# Patient Record
Sex: MALE | Race: ASIAN | NOT HISPANIC OR LATINO
[De-identification: names, ages, dates, MRNs, and addresses within clinical notes are randomized per-mention and may not be internally consistent; named-entity substitution may affect disease eponyms.]

---

## 2021-10-15 PROBLEM — Z00.00 ENCOUNTER FOR PREVENTIVE HEALTH EXAMINATION: Status: ACTIVE | Noted: 2021-10-15

## 2021-10-18 ENCOUNTER — APPOINTMENT (OUTPATIENT)
Age: 66
End: 2021-10-18
Payer: MEDICARE

## 2021-10-18 DIAGNOSIS — Z86.79 PERSONAL HISTORY OF OTHER DISEASES OF THE CIRCULATORY SYSTEM: ICD-10-CM

## 2021-10-18 DIAGNOSIS — Z87.891 PERSONAL HISTORY OF NICOTINE DEPENDENCE: ICD-10-CM

## 2021-10-18 PROCEDURE — 99204 OFFICE O/P NEW MOD 45 MIN: CPT

## 2021-10-20 ENCOUNTER — APPOINTMENT (OUTPATIENT)
Dept: UROLOGY | Facility: CLINIC | Age: 66
End: 2021-10-20
Payer: MEDICARE

## 2021-10-20 VITALS
BODY MASS INDEX: 23.64 KG/M2 | HEART RATE: 78 BPM | DIASTOLIC BLOOD PRESSURE: 82 MMHG | SYSTOLIC BLOOD PRESSURE: 143 MMHG | TEMPERATURE: 97.5 F | HEIGHT: 68 IN | WEIGHT: 156 LBS

## 2021-10-20 PROCEDURE — 99214 OFFICE O/P EST MOD 30 MIN: CPT

## 2021-10-20 NOTE — END OF VISIT
[FreeTextEntry3] : 67yo male with incidental finding of 10cm right renal mass with renal vein thrombus extending into IVC but below hepatics on CT imaging. Imaging and report reviewed. No adenopathy. Recommend MRI for further evaluation. Recommend CT chest for staging. Reviewed with patient recommendation for right radical nephrectomy and IVC thrombectomy. Surgery may involve Vascular and Hepatobiliary colleagues as needed. May benefit from adjuvant immunotherapy treatment if cancer is localized. He is considering going to [facility] for surgery and will call us back to let us know.

## 2021-10-20 NOTE — LETTER BODY
[Dear  ___] : Dear  [unfilled], [Courtesy Letter:] : I had the pleasure of seeing your patient, [unfilled], in my office today. [Please see my note below.] : Please see my note below. [Consult Closing:] : Thank you very much for allowing me to participate in the care of this patient.  If you have any questions, please do not hesitate to contact me. [Sincerely,] : Sincerely, [FreeTextEntry2] : Shirin Mills MD\par [address], [address]\[place] [address] [FreeTextEntry3] : Jaden Garrett MD, FACS\par Urologic Oncology\par Department of Urology\par [facility]\par \par [employer] [facility] \par \par

## 2021-10-20 NOTE — ASSESSMENT
[FreeTextEntry1] : 65 yo M with right renal mass with vein thrombus\par \par - Reviewed imaging through [facility] radiology portal and confirmed findings as stated above.\par - Discussed findings with pt. Given size and thrombus, would recommend right radical nephrectomy with IVC thrombectomy. Discussed risks and benefits of procedure and pt eager to proceed.\par - WIll need to finish staging including labwork and full abdominal and pelvic imaging and chest.\par - Pt actually lives in [place] and requesting if surgery can be done in [place]. Will reach out to Dr. Jaden Garrett at [facility] to see pt asap.\par - Of note, pt is thinking of going back to [place] for procedure as well. Will keep us posted.

## 2021-10-20 NOTE — HISTORY OF PRESENT ILLNESS
[FreeTextEntry1] : 67 yo Irish male with PMHx HTN presents for 10cm right renal mass. First discovered on abdominal US which he reports was ordered by his primary for health maintenance-he has no complaints. Previous abdominal surgery appendectomy. He has no family hx of  malignancies. He is a former smoker (8 pack year hx). Denies hematuria, CP, SOB, fevers, unexplained weight loss.\par \par Today he feels well overall except for a mild headache but he had 2 teeth removed last week and SHAHID has been relieved by advil.\par \par CT w/ and w/o at [facility]iology on 10/12/21: 10.1 x 7.4 x 7.4cm heterogeneous enhancing partially exophytic mass mid to lower pole of right kidney with extensive neovascularity and extension to the renal pelvis at the lower pole. No hydronephrosis. Filling defect within right main renal vein extending into infrahepatic IVC consistent with thrombosis. Few associated collateral vessels noted in right renal fossa region. No LAD. [None] : None

## 2021-10-20 NOTE — ASSESSMENT
[FreeTextEntry1] : 67 yo male with PMHx HTN presents for 10cm right renal mass and IVC thrombus. \par \par 1. Recommended R radical nephrectomy-discussed indications, expected side effects, risks/benefits, and recovery of surgery\par 2. Medical and cardiac clearance\par 3. Will need MR abdomen renal mass protocol and CT for staging\par \par He would like to discuss with his wife today and will let us know tomorrow if moving forward\par Will place orders at that time

## 2021-10-20 NOTE — HISTORY OF PRESENT ILLNESS
[FreeTextEntry1] : 65 yo Tajik speaking M recently underwent abdominal US\par Found to have a renal mass\par Subsequent CT showed large right 10cm renal mass with IVC thrombus\par No symptoms including bothersome LUTS, gross hematuria or flank pain\par Quit smoking about 18 months ago

## 2021-10-21 ENCOUNTER — APPOINTMENT (OUTPATIENT)
Dept: HEART AND VASCULAR | Facility: CLINIC | Age: 66
End: 2021-10-21
Payer: MEDICARE

## 2021-10-21 ENCOUNTER — NON-APPOINTMENT (OUTPATIENT)
Age: 66
End: 2021-10-21

## 2021-10-21 VITALS
BODY MASS INDEX: 23.95 KG/M2 | HEIGHT: 68 IN | TEMPERATURE: 98.6 F | SYSTOLIC BLOOD PRESSURE: 120 MMHG | WEIGHT: 158 LBS | DIASTOLIC BLOOD PRESSURE: 60 MMHG | HEART RATE: 63 BPM

## 2021-10-21 LAB
ALBUMIN SERPL ELPH-MCNC: 4.2 G/DL
ALP BLD-CCNC: 94 U/L
ALT SERPL-CCNC: 18 U/L
ANION GAP SERPL CALC-SCNC: 11 MMOL/L
APPEARANCE: CLEAR
APTT BLD: 34.7 SEC
AST SERPL-CCNC: 28 U/L
BACTERIA: NEGATIVE
BASOPHILS # BLD AUTO: 0.06 K/UL
BASOPHILS NFR BLD AUTO: 1.2 %
BILIRUB SERPL-MCNC: 0.4 MG/DL
BILIRUBIN URINE: NEGATIVE
BLOOD URINE: NEGATIVE
BUN SERPL-MCNC: 22 MG/DL
CALCIUM SERPL-MCNC: 9.7 MG/DL
CHLORIDE SERPL-SCNC: 105 MMOL/L
CO2 SERPL-SCNC: 25 MMOL/L
COLOR: NORMAL
CREAT SERPL-MCNC: 1.42 MG/DL
EOSINOPHIL # BLD AUTO: 0.05 K/UL
EOSINOPHIL NFR BLD AUTO: 1 %
GLUCOSE QUALITATIVE U: NEGATIVE
GLUCOSE SERPL-MCNC: 112 MG/DL
HCT VFR BLD CALC: 44.8 %
HGB BLD-MCNC: 14.8 G/DL
HYALINE CASTS: 0 /LPF
IMM GRANULOCYTES NFR BLD AUTO: 0.2 %
INR PPP: 0.92 RATIO
KETONES URINE: NEGATIVE
LEUKOCYTE ESTERASE URINE: NEGATIVE
LYMPHOCYTES # BLD AUTO: 1.35 K/UL
LYMPHOCYTES NFR BLD AUTO: 26.1 %
MAN DIFF?: NORMAL
MCHC RBC-ENTMCNC: 30.1 PG
MCHC RBC-ENTMCNC: 33 GM/DL
MCV RBC AUTO: 91.2 FL
MICROSCOPIC-UA: NORMAL
MONOCYTES # BLD AUTO: 0.54 K/UL
MONOCYTES NFR BLD AUTO: 10.4 %
NEUTROPHILS # BLD AUTO: 3.16 K/UL
NEUTROPHILS NFR BLD AUTO: 61.1 %
NITRITE URINE: NEGATIVE
PH URINE: 6.5
PLATELET # BLD AUTO: 220 K/UL
POTASSIUM SERPL-SCNC: 4.4 MMOL/L
PROT SERPL-MCNC: 7.1 G/DL
PROTEIN URINE: NEGATIVE
PT BLD: 11 SEC
RBC # BLD: 4.91 M/UL
RBC # FLD: 12.7 %
RED BLOOD CELLS URINE: 0 /HPF
SODIUM SERPL-SCNC: 141 MMOL/L
SPECIFIC GRAVITY URINE: 1.01
SQUAMOUS EPITHELIAL CELLS: 0 /HPF
UROBILINOGEN URINE: NORMAL
WBC # FLD AUTO: 5.17 K/UL
WHITE BLOOD CELLS URINE: 0 /HPF

## 2021-10-21 PROCEDURE — 99204 OFFICE O/P NEW MOD 45 MIN: CPT | Mod: 25

## 2021-10-21 PROCEDURE — 93000 ELECTROCARDIOGRAM COMPLETE: CPT

## 2021-10-22 ENCOUNTER — NON-APPOINTMENT (OUTPATIENT)
Age: 66
End: 2021-10-22

## 2021-10-22 ENCOUNTER — APPOINTMENT (OUTPATIENT)
Dept: HEART AND VASCULAR | Facility: CLINIC | Age: 66
End: 2021-10-22
Payer: MEDICARE

## 2021-10-22 DIAGNOSIS — I10 ESSENTIAL (PRIMARY) HYPERTENSION: ICD-10-CM

## 2021-10-22 DIAGNOSIS — R01.1 CARDIAC MURMUR, UNSPECIFIED: ICD-10-CM

## 2021-10-22 LAB — BACTERIA UR CULT: NORMAL

## 2021-10-22 PROCEDURE — 93306 TTE W/DOPPLER COMPLETE: CPT

## 2021-10-25 LAB — URINE CYTOLOGY: NORMAL

## 2021-10-28 ENCOUNTER — OUTPATIENT (OUTPATIENT)
Dept: OUTPATIENT SERVICES | Facility: HOSPITAL | Age: 66
LOS: 1 days | End: 2021-10-28
Payer: MEDICAID

## 2021-10-28 ENCOUNTER — APPOINTMENT (OUTPATIENT)
Dept: MRI IMAGING | Facility: HOSPITAL | Age: 66
End: 2021-10-28
Payer: MEDICARE

## 2021-10-28 ENCOUNTER — APPOINTMENT (OUTPATIENT)
Dept: CT IMAGING | Facility: HOSPITAL | Age: 66
End: 2021-10-28
Payer: MEDICARE

## 2021-10-28 PROCEDURE — 74183 MRI ABD W/O CNTR FLWD CNTR: CPT | Mod: 26

## 2021-10-28 PROCEDURE — 71250 CT THORAX DX C-: CPT | Mod: 26

## 2021-10-28 PROCEDURE — 74183 MRI ABD W/O CNTR FLWD CNTR: CPT

## 2021-10-28 PROCEDURE — 71250 CT THORAX DX C-: CPT

## 2021-11-01 ENCOUNTER — TRANSCRIPTION ENCOUNTER (OUTPATIENT)
Age: 66
End: 2021-11-01

## 2021-11-01 VITALS
SYSTOLIC BLOOD PRESSURE: 150 MMHG | RESPIRATION RATE: 18 BRPM | TEMPERATURE: 98 F | WEIGHT: 161.82 LBS | DIASTOLIC BLOOD PRESSURE: 70 MMHG | OXYGEN SATURATION: 97 % | HEIGHT: 68.11 IN | HEART RATE: 62 BPM

## 2021-11-02 ENCOUNTER — APPOINTMENT (OUTPATIENT)
Dept: UROLOGY | Facility: HOSPITAL | Age: 66
End: 2021-11-02

## 2021-11-02 ENCOUNTER — INPATIENT (INPATIENT)
Facility: HOSPITAL | Age: 66
LOS: 1 days | Discharge: HOME | DRG: 656 | End: 2021-11-04
Attending: UROLOGY | Admitting: UROLOGY
Payer: MEDICARE

## 2021-11-02 ENCOUNTER — RESULT REVIEW (OUTPATIENT)
Age: 66
End: 2021-11-02

## 2021-11-02 DIAGNOSIS — D49.511 NEOPLASM OF UNSPECIFIED BEHAVIOR OF RIGHT KIDNEY: ICD-10-CM

## 2021-11-02 DIAGNOSIS — Z98.49 CATARACT EXTRACTION STATUS, UNSPECIFIED EYE: Chronic | ICD-10-CM

## 2021-11-02 DIAGNOSIS — I10 ESSENTIAL (PRIMARY) HYPERTENSION: ICD-10-CM

## 2021-11-02 DIAGNOSIS — Z90.49 ACQUIRED ABSENCE OF OTHER SPECIFIED PARTS OF DIGESTIVE TRACT: Chronic | ICD-10-CM

## 2021-11-02 DIAGNOSIS — Z98.890 OTHER SPECIFIED POSTPROCEDURAL STATES: Chronic | ICD-10-CM

## 2021-11-02 LAB
ANION GAP SERPL CALC-SCNC: 11 MMOL/L — SIGNIFICANT CHANGE UP (ref 5–17)
BASE EXCESS BLDA CALC-SCNC: -7 MMOL/L — LOW (ref -2–3)
BLD GP AB SCN SERPL QL: NEGATIVE — SIGNIFICANT CHANGE UP
BUN SERPL-MCNC: 18 MG/DL — SIGNIFICANT CHANGE UP (ref 7–23)
CA-I BLDA-SCNC: 0.96 MMOL/L — LOW (ref 1.15–1.33)
CALCIUM SERPL-MCNC: 8.3 MG/DL — LOW (ref 8.4–10.5)
CHLORIDE SERPL-SCNC: 111 MMOL/L — HIGH (ref 96–108)
CO2 BLDA-SCNC: 19 MMOL/L — SIGNIFICANT CHANGE UP (ref 19–24)
CO2 SERPL-SCNC: 17 MMOL/L — LOW (ref 22–31)
COHGB MFR BLDA: 0.5 % — SIGNIFICANT CHANGE UP
CREAT SERPL-MCNC: 1.34 MG/DL — HIGH (ref 0.5–1.3)
GAS PNL BLDA: SIGNIFICANT CHANGE UP
GLUCOSE BLDC GLUCOMTR-MCNC: 189 MG/DL — HIGH (ref 70–99)
GLUCOSE SERPL-MCNC: 205 MG/DL — HIGH (ref 70–99)
HCO3 BLDA-SCNC: 18 MMOL/L — LOW (ref 21–28)
HCT VFR BLD CALC: 33.1 % — LOW (ref 39–50)
HGB BLD-MCNC: 10.8 G/DL — LOW (ref 13–17)
HGB BLDA-MCNC: 10.7 G/DL — LOW (ref 12.6–17.4)
MAGNESIUM SERPL-MCNC: 1.4 MG/DL — LOW (ref 1.6–2.6)
MCHC RBC-ENTMCNC: 30.3 PG — SIGNIFICANT CHANGE UP (ref 27–34)
MCHC RBC-ENTMCNC: 32.6 GM/DL — SIGNIFICANT CHANGE UP (ref 32–36)
MCV RBC AUTO: 93 FL — SIGNIFICANT CHANGE UP (ref 80–100)
METHGB MFR BLDA: 0.2 % — SIGNIFICANT CHANGE UP
NRBC # BLD: 0 /100 WBCS — SIGNIFICANT CHANGE UP (ref 0–0)
OXYHGB MFR BLDA: 98.2 % — HIGH (ref 90–95)
PCO2 BLDA: 32 MMHG — LOW (ref 35–48)
PH BLDA: 7.35 — SIGNIFICANT CHANGE UP (ref 7.35–7.45)
PLATELET # BLD AUTO: 162 K/UL — SIGNIFICANT CHANGE UP (ref 150–400)
PO2 BLDA: 357 MMHG — HIGH (ref 83–108)
POTASSIUM BLDA-SCNC: 4 MMOL/L — SIGNIFICANT CHANGE UP (ref 3.5–5.1)
POTASSIUM SERPL-MCNC: 3.9 MMOL/L — SIGNIFICANT CHANGE UP (ref 3.5–5.3)
POTASSIUM SERPL-SCNC: 3.9 MMOL/L — SIGNIFICANT CHANGE UP (ref 3.5–5.3)
RBC # BLD: 3.56 M/UL — LOW (ref 4.2–5.8)
RBC # FLD: 12.9 % — SIGNIFICANT CHANGE UP (ref 10.3–14.5)
RH IG SCN BLD-IMP: POSITIVE — SIGNIFICANT CHANGE UP
SAO2 % BLDA: 98.9 % — HIGH (ref 94–98)
SARS-COV-2 N GENE NPH QL NAA+PROBE: NOT DETECTED
SODIUM BLDA-SCNC: 136 MMOL/L — SIGNIFICANT CHANGE UP (ref 136–145)
SODIUM SERPL-SCNC: 139 MMOL/L — SIGNIFICANT CHANGE UP (ref 135–145)
WBC # BLD: 18.49 K/UL — HIGH (ref 3.8–10.5)
WBC # FLD AUTO: 18.49 K/UL — HIGH (ref 3.8–10.5)

## 2021-11-02 PROCEDURE — 88307 TISSUE EXAM BY PATHOLOGIST: CPT | Mod: 26

## 2021-11-02 PROCEDURE — 34502 RECONSTRUCT VENA CAVA: CPT

## 2021-11-02 PROCEDURE — 50230 REMOVAL KIDNEY OPEN RADICAL: CPT | Mod: RT

## 2021-11-02 RX ORDER — MAGNESIUM SULFATE 500 MG/ML
2 VIAL (ML) INJECTION ONCE
Refills: 0 | Status: COMPLETED | OUTPATIENT
Start: 2021-11-02 | End: 2021-11-02

## 2021-11-02 RX ORDER — OXYCODONE AND ACETAMINOPHEN 5; 325 MG/1; MG/1
1 TABLET ORAL EVERY 4 HOURS
Refills: 0 | Status: DISCONTINUED | OUTPATIENT
Start: 2021-11-02 | End: 2021-11-04

## 2021-11-02 RX ORDER — LOSARTAN/HYDROCHLOROTHIAZIDE 100MG-25MG
1 TABLET ORAL
Qty: 0 | Refills: 0 | DISCHARGE

## 2021-11-02 RX ORDER — CEFAZOLIN SODIUM 1 G
1000 VIAL (EA) INJECTION EVERY 8 HOURS
Refills: 0 | Status: COMPLETED | OUTPATIENT
Start: 2021-11-02 | End: 2021-11-03

## 2021-11-02 RX ORDER — OXYCODONE AND ACETAMINOPHEN 5; 325 MG/1; MG/1
2 TABLET ORAL EVERY 6 HOURS
Refills: 0 | Status: DISCONTINUED | OUTPATIENT
Start: 2021-11-02 | End: 2021-11-04

## 2021-11-02 RX ORDER — HYDROMORPHONE HYDROCHLORIDE 2 MG/ML
0.5 INJECTION INTRAMUSCULAR; INTRAVENOUS; SUBCUTANEOUS ONCE
Refills: 0 | Status: DISCONTINUED | OUTPATIENT
Start: 2021-11-02 | End: 2021-11-02

## 2021-11-02 RX ORDER — BUPIVACAINE 13.3 MG/ML
20 INJECTION, SUSPENSION, LIPOSOMAL INFILTRATION ONCE
Refills: 0 | Status: DISCONTINUED | OUTPATIENT
Start: 2021-11-02 | End: 2021-11-04

## 2021-11-02 RX ORDER — INFLUENZA VIRUS VACCINE 15; 15; 15; 15 UG/.5ML; UG/.5ML; UG/.5ML; UG/.5ML
0.7 SUSPENSION INTRAMUSCULAR ONCE
Refills: 0 | Status: DISCONTINUED | OUTPATIENT
Start: 2021-11-02 | End: 2021-11-04

## 2021-11-02 RX ORDER — SODIUM CHLORIDE 9 MG/ML
1000 INJECTION, SOLUTION INTRAVENOUS
Refills: 0 | Status: DISCONTINUED | OUTPATIENT
Start: 2021-11-02 | End: 2021-11-04

## 2021-11-02 RX ORDER — HEPARIN SODIUM 5000 [USP'U]/ML
5000 INJECTION INTRAVENOUS; SUBCUTANEOUS EVERY 8 HOURS
Refills: 0 | Status: DISCONTINUED | OUTPATIENT
Start: 2021-11-02 | End: 2021-11-04

## 2021-11-02 RX ORDER — MORPHINE SULFATE 50 MG/1
2 CAPSULE, EXTENDED RELEASE ORAL
Refills: 0 | Status: DISCONTINUED | OUTPATIENT
Start: 2021-11-02 | End: 2021-11-04

## 2021-11-02 RX ADMIN — Medication 50 GRAM(S): at 14:45

## 2021-11-02 RX ADMIN — HYDROMORPHONE HYDROCHLORIDE 0.5 MILLIGRAM(S): 2 INJECTION INTRAMUSCULAR; INTRAVENOUS; SUBCUTANEOUS at 14:10

## 2021-11-02 RX ADMIN — SODIUM CHLORIDE 110 MILLILITER(S): 9 INJECTION, SOLUTION INTRAVENOUS at 22:58

## 2021-11-02 RX ADMIN — Medication 100 MILLIGRAM(S): at 14:29

## 2021-11-02 RX ADMIN — OXYCODONE AND ACETAMINOPHEN 2 TABLET(S): 5; 325 TABLET ORAL at 18:50

## 2021-11-02 RX ADMIN — Medication 100 MILLIGRAM(S): at 21:03

## 2021-11-02 RX ADMIN — MORPHINE SULFATE 2 MILLIGRAM(S): 50 CAPSULE, EXTENDED RELEASE ORAL at 16:00

## 2021-11-02 RX ADMIN — MORPHINE SULFATE 2 MILLIGRAM(S): 50 CAPSULE, EXTENDED RELEASE ORAL at 13:23

## 2021-11-02 RX ADMIN — HYDROMORPHONE HYDROCHLORIDE 0.5 MILLIGRAM(S): 2 INJECTION INTRAMUSCULAR; INTRAVENOUS; SUBCUTANEOUS at 13:39

## 2021-11-02 RX ADMIN — OXYCODONE AND ACETAMINOPHEN 1 TABLET(S): 5; 325 TABLET ORAL at 22:02

## 2021-11-02 RX ADMIN — MORPHINE SULFATE 2 MILLIGRAM(S): 50 CAPSULE, EXTENDED RELEASE ORAL at 14:00

## 2021-11-02 RX ADMIN — HEPARIN SODIUM 5000 UNIT(S): 5000 INJECTION INTRAVENOUS; SUBCUTANEOUS at 17:13

## 2021-11-02 RX ADMIN — OXYCODONE AND ACETAMINOPHEN 2 TABLET(S): 5; 325 TABLET ORAL at 19:30

## 2021-11-02 RX ADMIN — OXYCODONE AND ACETAMINOPHEN 1 TABLET(S): 5; 325 TABLET ORAL at 21:02

## 2021-11-02 RX ADMIN — MORPHINE SULFATE 2 MILLIGRAM(S): 50 CAPSULE, EXTENDED RELEASE ORAL at 14:20

## 2021-11-02 NOTE — PACU DISCHARGE NOTE - COMMENTS
Verbal report given to RN Wyatt, v/s stable, surgical site intact and clean, cov. with [employer]. pt. to be transferred to room 819

## 2021-11-02 NOTE — BRIEF OPERATIVE NOTE - NSICDXBRIEFPOSTOP_GEN_ALL_CORE_FT
POST-OP DIAGNOSIS:  Renal mass, right 02-Nov-2021 12:48:29  Konrad Calderón  
POST-OP DIAGNOSIS:  Renal mass, right 02-Nov-2021 12:48:29  Konrad Calderón

## 2021-11-02 NOTE — CHART NOTE - NSCHARTNOTEFT_GEN_A_CORE
Placed supine in bed for right IJ cordis removal.  Sutures removed, cordis pulled, pressure applied until hemostasis achieved.  Dry sterile dressing placed and anchored with tegaderm.  Patient tolerated without incident.

## 2021-11-02 NOTE — BRIEF OPERATIVE NOTE - OPERATION/FINDINGS
Right Radical nephrectomy performed by Urology. Thrombus noted in renal vein extending to IVC. Tumor thrombus was milked back into right renal vein and right renal vein and artery were dissected and ligated. Venotomy in IVC closed primarily. Refer to Urology OP note for further details

## 2021-11-02 NOTE — BRIEF OPERATIVE NOTE - NSICDXBRIEFPREOP_GEN_ALL_CORE_FT
PRE-OP DIAGNOSIS:  Renal mass, right 02-Nov-2021 12:47:59  Konrad Calderón  
PRE-OP DIAGNOSIS:  Renal mass, right 02-Nov-2021 12:47:59  Konrad Calderón

## 2021-11-02 NOTE — BRIEF OPERATIVE NOTE - NSICDXBRIEFPROCEDURE_GEN_ALL_CORE_FT
PROCEDURES:  Open radical nephrectomy 02-Nov-2021 12:47:44  Konrad Calderón  
PROCEDURES:  Open radical nephrectomy 02-Nov-2021 12:47:44  Konrad Calderón

## 2021-11-03 ENCOUNTER — APPOINTMENT (OUTPATIENT)
Dept: UROLOGY | Facility: CLINIC | Age: 66
End: 2021-11-03

## 2021-11-03 LAB
ANION GAP SERPL CALC-SCNC: 12 MMOL/L — SIGNIFICANT CHANGE UP (ref 5–17)
BUN SERPL-MCNC: 27 MG/DL — HIGH (ref 7–23)
CALCIUM SERPL-MCNC: 8.5 MG/DL — SIGNIFICANT CHANGE UP (ref 8.4–10.5)
CHLORIDE SERPL-SCNC: 99 MMOL/L — SIGNIFICANT CHANGE UP (ref 96–108)
CO2 SERPL-SCNC: 24 MMOL/L — SIGNIFICANT CHANGE UP (ref 22–31)
CREAT SERPL-MCNC: 1.8 MG/DL — HIGH (ref 0.5–1.3)
GLUCOSE BLDC GLUCOMTR-MCNC: 133 MG/DL — HIGH (ref 70–99)
GLUCOSE SERPL-MCNC: 140 MG/DL — HIGH (ref 70–99)
HCT VFR BLD CALC: 31.5 % — LOW (ref 39–50)
HGB BLD-MCNC: 10.2 G/DL — LOW (ref 13–17)
MCHC RBC-ENTMCNC: 29.1 PG — SIGNIFICANT CHANGE UP (ref 27–34)
MCHC RBC-ENTMCNC: 32.4 GM/DL — SIGNIFICANT CHANGE UP (ref 32–36)
MCV RBC AUTO: 90 FL — SIGNIFICANT CHANGE UP (ref 80–100)
NRBC # BLD: 0 /100 WBCS — SIGNIFICANT CHANGE UP (ref 0–0)
PLATELET # BLD AUTO: 145 K/UL — LOW (ref 150–400)
POTASSIUM SERPL-MCNC: 4.9 MMOL/L — SIGNIFICANT CHANGE UP (ref 3.5–5.3)
POTASSIUM SERPL-SCNC: 4.9 MMOL/L — SIGNIFICANT CHANGE UP (ref 3.5–5.3)
RBC # BLD: 3.5 M/UL — LOW (ref 4.2–5.8)
RBC # FLD: 13.1 % — SIGNIFICANT CHANGE UP (ref 10.3–14.5)
SODIUM SERPL-SCNC: 135 MMOL/L — SIGNIFICANT CHANGE UP (ref 135–145)
WBC # BLD: 13.68 K/UL — HIGH (ref 3.8–10.5)
WBC # FLD AUTO: 13.68 K/UL — HIGH (ref 3.8–10.5)

## 2021-11-03 RX ORDER — LOSARTAN POTASSIUM 100 MG/1
100 TABLET, FILM COATED ORAL ONCE
Refills: 0 | Status: COMPLETED | OUTPATIENT
Start: 2021-11-03 | End: 2021-11-03

## 2021-11-03 RX ADMIN — OXYCODONE AND ACETAMINOPHEN 2 TABLET(S): 5; 325 TABLET ORAL at 06:53

## 2021-11-03 RX ADMIN — OXYCODONE AND ACETAMINOPHEN 1 TABLET(S): 5; 325 TABLET ORAL at 09:49

## 2021-11-03 RX ADMIN — HEPARIN SODIUM 5000 UNIT(S): 5000 INJECTION INTRAVENOUS; SUBCUTANEOUS at 09:30

## 2021-11-03 RX ADMIN — OXYCODONE AND ACETAMINOPHEN 2 TABLET(S): 5; 325 TABLET ORAL at 01:52

## 2021-11-03 RX ADMIN — OXYCODONE AND ACETAMINOPHEN 1 TABLET(S): 5; 325 TABLET ORAL at 09:30

## 2021-11-03 RX ADMIN — LOSARTAN POTASSIUM 100 MILLIGRAM(S): 100 TABLET, FILM COATED ORAL at 16:24

## 2021-11-03 RX ADMIN — Medication 100 MILLIGRAM(S): at 05:24

## 2021-11-03 RX ADMIN — HEPARIN SODIUM 5000 UNIT(S): 5000 INJECTION INTRAVENOUS; SUBCUTANEOUS at 16:24

## 2021-11-03 RX ADMIN — HEPARIN SODIUM 5000 UNIT(S): 5000 INJECTION INTRAVENOUS; SUBCUTANEOUS at 00:47

## 2021-11-03 RX ADMIN — OXYCODONE AND ACETAMINOPHEN 1 TABLET(S): 5; 325 TABLET ORAL at 05:00

## 2021-11-03 RX ADMIN — OXYCODONE AND ACETAMINOPHEN 2 TABLET(S): 5; 325 TABLET ORAL at 00:52

## 2021-11-03 RX ADMIN — OXYCODONE AND ACETAMINOPHEN 1 TABLET(S): 5; 325 TABLET ORAL at 02:55

## 2021-11-03 NOTE — PHYSICAL THERAPY INITIAL EVALUATION ADULT - ADDITIONAL COMMENTS
Pt lives in elevator building without stairs with wife. Indpt prior to sx, no limitations. Amb tolerance ~1 hour. No falls, no device use.

## 2021-11-03 NOTE — PHYSICAL THERAPY INITIAL EVALUATION ADULT - GENERAL OBSERVATIONS, REHAB EVAL
Spoke to RN Thi, pt cleared for PT. POD#1 R open nephrectomy. Pt rcvd OOB to chair, +tele, +chairalarm, +IV, incision with scant bloody drainage. Pt agreeable to PT, reports 6/10 pain, daughter bedside. Tolerated session fairly well, demo CG transfers, supervision amb 75ftx2, CG 4 steps.

## 2021-11-04 ENCOUNTER — TRANSCRIPTION ENCOUNTER (OUTPATIENT)
Age: 66
End: 2021-11-04

## 2021-11-04 VITALS
DIASTOLIC BLOOD PRESSURE: 72 MMHG | OXYGEN SATURATION: 93 % | HEART RATE: 78 BPM | RESPIRATION RATE: 17 BRPM | SYSTOLIC BLOOD PRESSURE: 152 MMHG

## 2021-11-04 LAB
ANION GAP SERPL CALC-SCNC: 9 MMOL/L — SIGNIFICANT CHANGE UP (ref 5–17)
BASOPHILS # BLD AUTO: 0.04 K/UL — SIGNIFICANT CHANGE UP (ref 0–0.2)
BASOPHILS NFR BLD AUTO: 0.4 % — SIGNIFICANT CHANGE UP (ref 0–2)
BUN SERPL-MCNC: 24 MG/DL — HIGH (ref 7–23)
CALCIUM SERPL-MCNC: 8.4 MG/DL — SIGNIFICANT CHANGE UP (ref 8.4–10.5)
CHLORIDE SERPL-SCNC: 106 MMOL/L — SIGNIFICANT CHANGE UP (ref 96–108)
CO2 SERPL-SCNC: 24 MMOL/L — SIGNIFICANT CHANGE UP (ref 22–31)
CREAT SERPL-MCNC: 1.84 MG/DL — HIGH (ref 0.5–1.3)
EOSINOPHIL # BLD AUTO: 0.01 K/UL — SIGNIFICANT CHANGE UP (ref 0–0.5)
EOSINOPHIL NFR BLD AUTO: 0.1 % — SIGNIFICANT CHANGE UP (ref 0–6)
GLUCOSE SERPL-MCNC: 124 MG/DL — HIGH (ref 70–99)
HCT VFR BLD CALC: 25.8 % — LOW (ref 39–50)
HGB BLD-MCNC: 8.7 G/DL — LOW (ref 13–17)
IMM GRANULOCYTES NFR BLD AUTO: 0.4 % — SIGNIFICANT CHANGE UP (ref 0–1.5)
LYMPHOCYTES # BLD AUTO: 1.23 K/UL — SIGNIFICANT CHANGE UP (ref 1–3.3)
LYMPHOCYTES # BLD AUTO: 11.1 % — LOW (ref 13–44)
MAGNESIUM SERPL-MCNC: 1.8 MG/DL — SIGNIFICANT CHANGE UP (ref 1.6–2.6)
MCHC RBC-ENTMCNC: 30.7 PG — SIGNIFICANT CHANGE UP (ref 27–34)
MCHC RBC-ENTMCNC: 33.7 GM/DL — SIGNIFICANT CHANGE UP (ref 32–36)
MCV RBC AUTO: 91.2 FL — SIGNIFICANT CHANGE UP (ref 80–100)
MONOCYTES # BLD AUTO: 0.72 K/UL — SIGNIFICANT CHANGE UP (ref 0–0.9)
MONOCYTES NFR BLD AUTO: 6.5 % — SIGNIFICANT CHANGE UP (ref 2–14)
NEUTROPHILS # BLD AUTO: 9.03 K/UL — HIGH (ref 1.8–7.4)
NEUTROPHILS NFR BLD AUTO: 81.5 % — HIGH (ref 43–77)
NRBC # BLD: 0 /100 WBCS — SIGNIFICANT CHANGE UP (ref 0–0)
PHOSPHATE SERPL-MCNC: 2 MG/DL — LOW (ref 2.5–4.5)
PLATELET # BLD AUTO: 127 K/UL — LOW (ref 150–400)
POTASSIUM SERPL-MCNC: 4.2 MMOL/L — SIGNIFICANT CHANGE UP (ref 3.5–5.3)
POTASSIUM SERPL-SCNC: 4.2 MMOL/L — SIGNIFICANT CHANGE UP (ref 3.5–5.3)
RBC # BLD: 2.83 M/UL — LOW (ref 4.2–5.8)
RBC # FLD: 13 % — SIGNIFICANT CHANGE UP (ref 10.3–14.5)
SODIUM SERPL-SCNC: 139 MMOL/L — SIGNIFICANT CHANGE UP (ref 135–145)
WBC # BLD: 11.07 K/UL — HIGH (ref 3.8–10.5)
WBC # FLD AUTO: 11.07 K/UL — HIGH (ref 3.8–10.5)

## 2021-11-04 RX ORDER — SODIUM,POTASSIUM PHOSPHATES 278-250MG
1 POWDER IN PACKET (EA) ORAL ONCE
Refills: 0 | Status: DISCONTINUED | OUTPATIENT
Start: 2021-11-04 | End: 2021-11-04

## 2021-11-04 RX ADMIN — OXYCODONE AND ACETAMINOPHEN 2 TABLET(S): 5; 325 TABLET ORAL at 06:05

## 2021-11-04 RX ADMIN — HEPARIN SODIUM 5000 UNIT(S): 5000 INJECTION INTRAVENOUS; SUBCUTANEOUS at 07:25

## 2021-11-04 RX ADMIN — HEPARIN SODIUM 5000 UNIT(S): 5000 INJECTION INTRAVENOUS; SUBCUTANEOUS at 00:28

## 2021-11-04 RX ADMIN — HEPARIN SODIUM 5000 UNIT(S): 5000 INJECTION INTRAVENOUS; SUBCUTANEOUS at 17:33

## 2021-11-04 RX ADMIN — OXYCODONE AND ACETAMINOPHEN 2 TABLET(S): 5; 325 TABLET ORAL at 06:35

## 2021-11-04 NOTE — DISCHARGE NOTE NURSING/CASE MANAGEMENT/SOCIAL WORK - PATIENT PORTAL LINK FT
You can access the FollowMyHealth Patient Portal offered by [facility] by registering at the following website: [email or web address]. By joining [employer]’s FollowMyHealth portal, you will also be able to view your health information using other applications (apps) compatible with our system.

## 2021-11-04 NOTE — PROGRESS NOTE ADULT - SUBJECTIVE AND OBJECTIVE BOX
POD: 1  Procedure: R radical nephrectomy, IVC thrombectomy    SUBJECTIVE: Resting comfortably in chair. Pain is tolerable, has already walked around this morning. Marimar CLD, No flatus or BM yet. Hiccuping all through the night.       Vital Signs Last 24 Hrs  T(C): 36.8 (03 Nov 2021 08:56), Max: 37 (03 Nov 2021 05:12)  T(F): 98.2 (03 Nov 2021 08:56), Max: 98.6 (03 Nov 2021 05:12)  HR: 70 (03 Nov 2021 08:30) (65 - 89)  BP: 141/64 (03 Nov 2021 08:30) (99/56 - 141/64)  BP(mean): 92 (03 Nov 2021 08:30) (74 - 92)  RR: 18 (03 Nov 2021 08:30) (11 - 27)  SpO2: 93% (03 Nov 2021 08:30) (93% - 99%)    Physical Exam:  General: NAD  Pulmonary: Nonlabored breathing, no respiratory distress  Abdominal: soft, nondistended, appropriate incisional tender most concentrated on the right side  Extremities: WWP, normal strength, no LE edema, palpable DP pulses bilaterally   Neuro: A/O x3    Lines/drains/tubes:    I&O's Summary    02 Nov 2021 07:01  -  03 Nov 2021 07:00  --------------------------------------------------------  IN: 2090 mL / OUT: 1160 mL / NET: 930 mL        LABS:                        10.8   18.49 )-----------( 162      ( 02 Nov 2021 14:08 )             33.1     11-02    139  |  111<H>  |  18  ----------------------------<  205<H>  3.9   |  17<L>  |  1.34<H>    Ca    8.3<L>      02 Nov 2021 14:08  Mg     1.4     11-02          CAPILLARY BLOOD GLUCOSE      POCT Blood Glucose.: 189 mg/dL (02 Nov 2021 11:37)        RADIOLOGY & ADDITIONAL STUDIES:    
Vascular Surgery Post-Op Note     Pre-Op Dx: R renal mass w/ IVC involvement  Procedure: Open radical nephrectomy    Subjective: Patient resting comfortably in bed. Pain is about a 3/10.     Vital Signs Last 24 Hrs  T(C): 36.4 (02 Nov 2021 16:25), Max: 36.4 (02 Nov 2021 13:00)  T(F): 97.5 (02 Nov 2021 16:25), Max: 97.6 (02 Nov 2021 13:00)  HR: 69 (02 Nov 2021 16:25) (65 - 89)  BP: 126/69 (02 Nov 2021 16:25) (99/56 - 131/57)  BP(mean): 92 (02 Nov 2021 16:25) (74 - 92)  RR: 20 (02 Nov 2021 16:25) (11 - 27)  SpO2: 96% (02 Nov 2021 16:25) (95% - 99%)    Physical Exam:  General: NAD, resting comfortably in bed  Pulmonary: Nonlabored breathing, no respiratory distress  Abdominal: soft, nondistended, appropriately tender near incision with no rebound or guarding, incision CDI   Extremities: WWP, no edema, no pain, palpable DP bilaterally   Neuro: A/O x 3      LABS:                        10.8   18.49 )-----------( 162      ( 02 Nov 2021 14:08 )             33.1     11-02    139  |  111<H>  |  18  ----------------------------<  205<H>  3.9   |  17<L>  |  1.34<H>    Ca    8.3<L>      02 Nov 2021 14:08  Mg     1.4     11-02        CAPILLARY BLOOD GLUCOSE      POCT Blood Glucose.: 189 mg/dL (02 Nov 2021 11:37)        ABO Interpretation: O (11-02 @ 09:04)        Radiology and Additional Studies:    Assessment:66y Male s/p Open radical nephrectomy    Pain/nausea control PRN  Home meds  Incentive spirometer/OOB/Ambulate  IVF, Diet: clears  AM labs  
POD: 2  Procedure: R radical nephrectomy, IVC thrombectomy        SUBJECTIVE: Patient complains of hiccups last evening but overall feeling well. Ambulating around the room. Denies chest pain, dyspnea, n/v, leg pain. Not yet passed flatus or had bowel movement.       MEDICATIONS  (STANDING):  BUpivacaine liposome 1.3% Injectable (no eMAR) 20 milliLiter(s) Local Injection once  heparin   Injectable 5000 Unit(s) SubCutaneous every 8 hours  influenza  Vaccine (HIGH DOSE) 0.7 milliLiter(s) IntraMuscular once  lactated ringers. 1000 milliLiter(s) (110 mL/Hr) IV Continuous <Continuous>    MEDICATIONS  (PRN):  morphine  - Injectable 2 milliGRAM(s) IV Push every 15 minutes PRN Severe Pain (7 - 10)  oxycodone    5 mG/acetaminophen 325 mG 1 Tablet(s) Oral every 4 hours PRN Moderate Pain (4 - 6)  oxycodone    5 mG/acetaminophen 325 mG 2 Tablet(s) Oral every 6 hours PRN Severe Pain (7 - 10)      Vital Signs Last 24 Hrs  T(C): 37.3 (04 Nov 2021 04:58), Max: 37.3 (04 Nov 2021 04:58)  T(F): 99.2 (04 Nov 2021 04:58), Max: 99.2 (04 Nov 2021 04:58)  HR: 90 (04 Nov 2021 04:58) (62 - 90)  BP: 114/76 (04 Nov 2021 04:58) (114/76 - 166/72)  BP(mean): 99 (03 Nov 2021 15:35) (92 - 104)  RR: 18 (04 Nov 2021 04:58) (17 - 18)  SpO2: 91% (04 Nov 2021 04:58) (91% - 98%)    Physical Exam:  General: NAD  Pulmonary: Nonlabored breathing, no respiratory distress  Abdominal: soft, nondistended, incision cdi minimal tenderness  Extremities: WWP, normal strength, no LE edema, palpable DP pulses bilaterally   Neuro: A/O x3    I&O's Summary    03 Nov 2021 07:01  -  04 Nov 2021 07:00  --------------------------------------------------------  IN: 1980 mL / OUT: 2800 mL / NET: -820 mL        LABS:                        8.7    11.07 )-----------( 127      ( 04 Nov 2021 06:35 )             25.8     11-04    139  |  106  |  24<H>  ----------------------------<  124<H>  4.2   |  24  |  1.84<H>    Ca    8.4      04 Nov 2021 06:35  Phos  2.0     11-04  Mg     1.8     11-04          CAPILLARY BLOOD GLUCOSE      POCT Blood Glucose.: 133 mg/dL (03 Nov 2021 17:11)        RADIOLOGY & ADDITIONAL STUDIES:  
 AM Note    No acute events overnight.      Vital Signs Last 24 Hrs  T(C): 37 (11-03-21 @ 05:12), Max: 37 (11-03-21 @ 05:12)  T(F): 98.6 (11-03-21 @ 05:12), Max: 98.6 (11-03-21 @ 05:12)  HR: 68 (11-03-21 @ 04:02) (65 - 89)  BP: 127/69 (11-03-21 @ 04:02) (99/56 - 131/65)  BP(mean): 92 (11-03-21 @ 04:02) (74 - 92)  RR: 16 (11-03-21 @ 04:02) (11 - 27)  SpO2: 93% (11-03-21 @ 04:02) (93% - 99%)     02 Nov 2021 14:08    139    |  111    |  18     ----------------------------<  205    3.9     |  17     |  1.34     Ca    8.3        02 Nov 2021 14:08  Mg     1.4       02 Nov 2021 14:08                            10.8   18.49 )-----------( 162      ( 02 Nov 2021 14:08 )             33.1         I&O's Summary    02 Nov 2021 07:01  -  03 Nov 2021 06:21  --------------------------------------------------------  IN: 2090 mL / OUT: 1160 mL / NET: 930 mL          PHYSICAL EXAM:    GEN: NAD  ABD: incisions dry and clean, covered by clean dressing, minimally tender to palpation appropriate, minimal distention   : carrizales catheter in place draining yellow clear
 AM Note    No acute events overnight.      Vital Signs Last 24 Hrs  T(C): 37.3 (11-04-21 @ 04:58), Max: 37.3 (11-04-21 @ 04:58)  T(F): 99.2 (11-04-21 @ 04:58), Max: 99.2 (11-04-21 @ 04:58)  HR: 90 (11-04-21 @ 04:58) (62 - 90)  BP: 114/76 (11-04-21 @ 04:58) (114/76 - 166/72)  BP(mean): 99 (11-03-21 @ 15:35) (92 - 104)  RR: 18 (11-04-21 @ 04:58) (17 - 18)  SpO2: 91% (11-04-21 @ 04:58) (91% - 98%)     03 Nov 2021 11:41    135    |  99     |  27     ----------------------------<  140    4.9     |  24     |  1.80     Ca    8.5        03 Nov 2021 11:41  Mg     1.4       02 Nov 2021 14:08                            10.2   13.68 )-----------( 145      ( 03 Nov 2021 11:41 )             31.5         I&O's Summary    02 Nov 2021 07:01  -  03 Nov 2021 07:00  --------------------------------------------------------  IN: 2090 mL / OUT: 1160 mL / NET: 930 mL    03 Nov 2021 07:01  -  04 Nov 2021 05:40  --------------------------------------------------------  IN: 1980 mL / OUT: 2800 mL / NET: -820 mL          PHYSICAL EXAM:    GEN: awake and alert  ABD: nontender, nondistended  : no suprapubic/CVAT
 Post OP/Procedure note: DONOVAN [id number]    Patient reports to minimal abdominal discomfort appropriate to current state. He denies any nausea, vomiting, chest pain, sob, dizziness, weakness. Denies suprapubic discomfort.     PE  GEN: NAD  ABD: incisions dry and clean, covered by clean dressing, minimally tender to palpation appropriate, minimal distention   : carrizales catheter in place draining yellow clear    T(C): 36.7 (11-02-21 @ 17:29), Max: 36.7 (11-02-21 @ 17:29)  HR: 69 (11-02-21 @ 16:25) (65 - 89)  BP: 126/69 (11-02-21 @ 16:25) (99/56 - 131/57)  RR: 20 (11-02-21 @ 16:25) (11 - 27)  SpO2: 96% (11-02-21 @ 16:25) (95% - 99%)    11-02-21 @ 07:01  -  11-02-21 @ 17:52  --------------------------------------------------------  IN: 660 mL / OUT: 410 mL / NET: 250 mL

## 2021-11-04 NOTE — DISCHARGE NOTE PROVIDER - CARE PROVIDER_API CALL
Jaden Garrett)  Urology  [address], [facility], [place] [address]  Phone: [phone]  Fax: [phone]  Follow Up Time:

## 2021-11-04 NOTE — DISCHARGE NOTE PROVIDER - NSDCCPCAREPLAN_GEN_ALL_CORE_FT
PRINCIPAL DISCHARGE DIAGNOSIS  Diagnosis: Neoplasm of right kidney  Assessment and Plan of Treatment:       SECONDARY DISCHARGE DIAGNOSES  Diagnosis: Hypertension  Assessment and Plan of Treatment:

## 2021-11-04 NOTE — DISCHARGE NOTE PROVIDER - HOSPITAL COURSE
65yo male with PMH of right kidney cancer and HTN s/p right radical nephrectomy. Patient tolerated procedure well and no post-operative complications. Patient's VSS and is hemodynamically stable.

## 2021-11-04 NOTE — DISCHARGE NOTE PROVIDER - NSDCFUADDINST_GEN_ALL_CORE_FT
General Discharge Instructions:  Use Tylenol for pain control as needed  Please resume all regular home medications unless specifically advised not to take a particular medication. Also, please take any new medications as prescribed.  Please get plenty of rest, continue to ambulate several times per day, and drink adequate amounts of fluids.     Warning Signs:  Please call your doctor if you experience the following:  *You experience new chest pain, pressure, squeezing or tightness.  *New or worsening cough, shortness of breath, or wheeze.  *If you are vomiting and cannot keep down fluids or your medications.  *You are getting dehydrated due to continued vomiting, diarrhea, or other reasons. Signs of dehydration include dry mouth, rapid heartbeat, or feeling dizzy or faint when standing.  *You see blood or dark/black material when you vomit or have a bowel movement.  *You experience burning when you urinate, have blood in your urine, or experience a discharge.  *Your pain is not improving within 8-12 hours or is not gone within 24 hours. Call or return immediately if your pain is getting worse, changes location, or moves to your chest or back.  *You have shaking chills, or fever greater than 100.4 degrees Fahrenheit.  *Any change in your symptoms, or any new symptoms that concern you.

## 2021-11-04 NOTE — PROGRESS NOTE ADULT - ASSESSMENT
66 year old male with with R renal Ca s/p R radical nephrectomy
66 year old male with with R renal Ca s/p R radical nephrectomy, IVC thrombectomy. Recovering well on the floor. No LE edema.     -continue to monitor legs for signs of swelling  -continue to monitor for return of bowel function  -rest of care per Urology team 
66 year old male with with R renal Ca s/p R radical nephrectomy, IVC thrombectomy. Recovering well on the floor. No LE edema.     -continue to monitor legs for signs of swelling  -continue to monitor for bowel function  -rest of care per Urology team 
66 year old male with with R renal Ca s/p R radical nephrectomy
66 year old male with with R renal Ca s/p R radical nephrectomy

## 2021-11-04 NOTE — PROGRESS NOTE ADULT - PROBLEM SELECTOR PLAN 1
-stable  -tele bed  -pain control  -nausea control  -DVT prophylaxis  -Abx  -OOB, IS  -home meds  -am labs  -Bejarano catheter
-stable  -pain control  -nausea control  -DVT prophylaxis  -Abx  -OOB, IS  -home meds  -passed TOV  -am labs
-stable  -tele bed  -pain control  -nausea control  -DVT prophylaxis  -Abx  -OOB, IS  -home meds  -am labs  -Bejarano catheter

## 2021-11-04 NOTE — PROGRESS NOTE ADULT - PROBLEM SELECTOR PLAN 2
-hold home meds ( HCTZ)  -vitals  -labetalol PRN
-hold home meds (losartan HCTZ)  -vitals  -labetalol PRN
-hold home meds (losartan HCTZ)  -vitals  -labetalol PRN

## 2021-11-09 PROBLEM — D49.511 NEOPLASM OF UNSPECIFIED BEHAVIOR OF RIGHT KIDNEY: Chronic | Status: ACTIVE | Noted: 2021-11-01

## 2021-11-09 PROBLEM — Z86.79 PERSONAL HISTORY OF OTHER DISEASES OF THE CIRCULATORY SYSTEM: Chronic | Status: ACTIVE | Noted: 2021-11-01

## 2021-11-09 PROBLEM — Z87.19 PERSONAL HISTORY OF OTHER DISEASES OF THE DIGESTIVE SYSTEM: Chronic | Status: ACTIVE | Noted: 2021-11-01

## 2021-11-10 ENCOUNTER — APPOINTMENT (OUTPATIENT)
Dept: UROLOGY | Facility: CLINIC | Age: 66
End: 2021-11-10
Payer: MEDICAID

## 2021-11-10 VITALS — TEMPERATURE: 98.1 F | HEART RATE: 83 BPM | SYSTOLIC BLOOD PRESSURE: 121 MMHG | DIASTOLIC BLOOD PRESSURE: 77 MMHG

## 2021-11-10 DIAGNOSIS — C64.1 MALIGNANT NEOPLASM OF RIGHT KIDNEY, EXCEPT RENAL PELVIS: ICD-10-CM

## 2021-11-10 DIAGNOSIS — Z90.49 ACQUIRED ABSENCE OF OTHER SPECIFIED PARTS OF DIGESTIVE TRACT: ICD-10-CM

## 2021-11-10 DIAGNOSIS — I82.220 ACUTE EMBOLISM AND THROMBOSIS OF INFERIOR VENA CAVA: ICD-10-CM

## 2021-11-10 DIAGNOSIS — I82.3 EMBOLISM AND THROMBOSIS OF RENAL VEIN: ICD-10-CM

## 2021-11-10 DIAGNOSIS — I10 ESSENTIAL (PRIMARY) HYPERTENSION: ICD-10-CM

## 2021-11-10 PROCEDURE — 99024 POSTOP FOLLOW-UP VISIT: CPT

## 2021-11-10 NOTE — ASSESSMENT
[FreeTextEntry1] : 67 yo male with 10cm right renal mass and IVC thrombus. \par Metastatic workup negative\par s/p radical nephrectomy 11/2\par Doing well\par Check labs\par Referred to Med Onc for consideration of adjuvant IO\par F/u 3 months

## 2021-11-10 NOTE — REVIEW OF SYSTEMS
[Fever] : no fever [Chills] : no chills [Feeling Poorly] : not feeling poorly [Feeling Tired] : not feeling tired [Shortness Of Breath] : no shortness of breath [Abdominal Pain] : no abdominal pain [Constipation] : no constipation [Negative] : Psychiatric

## 2021-11-10 NOTE — HISTORY OF PRESENT ILLNESS
[FreeTextEntry1] : 65 yo Croatian male with PMHx HTN presents for 10cm right renal mass. First discovered on abdominal US which he reports was ordered by his primary for health maintenance-he has no complaints. Previous abdominal surgery appendectomy. He has no family hx of  malignancies. He is a former smoker (8 pack year hx). Denies hematuria, CP, SOB, fevers, unexplained weight loss.\par \par Today he feels well overall except for a mild headache but he had 2 teeth removed last week and SHAHID has been relieved by advil.\par \par CT w/ and w/o at [facility] on 10/12/21: 10.1 x 7.4 x 7.4cm heterogeneous enhancing partially exophytic mass mid to lower pole of right kidney with extensive neovascularity and extension to the renal pelvis at the lower pole. No hydronephrosis. Filling defect within right main renal vein extending into infrahepatic IVC consistent with thrombosis. Few associated collateral vessels noted in right renal fossa region. No LAD.\par \par 11/10/21 Here for postop visit. Underwent right radical nephrectomy with caval thrombus 11/2. Path pending. Discharged home POD#2. Doing well.  [None] : None

## 2021-11-10 NOTE — LETTER BODY
[Dear  ___] : Dear  [unfilled], [Courtesy Letter:] : I had the pleasure of seeing your patient, [unfilled], in my office today. [Please see my note below.] : Please see my note below. [Consult Closing:] : Thank you very much for allowing me to participate in the care of this patient.  If you have any questions, please do not hesitate to contact me. [Sincerely,] : Sincerely, [FreeTextEntry2] : Shirin Mills MD\par [address], [address]\[place] [address]  [FreeTextEntry3] : Jaden Garrett MD, FACS\par Urologic Oncology\par Department of Urology\par [facility]\par \par [employer] [facility] \par \par

## 2021-11-10 NOTE — PHYSICAL EXAM
[General Appearance - Well Developed] : well developed [General Appearance - Well Nourished] : well nourished [Normal Appearance] : normal appearance [Well Groomed] : well groomed [General Appearance - In No Acute Distress] : no acute distress [Abdomen Soft] : soft [Abdomen Tenderness] : non-tender [Costovertebral Angle Tenderness] : no ~M costovertebral angle tenderness [FreeTextEntry1] : incision c/d/i. Staples removed, steri strips applied

## 2021-11-11 LAB
ALBUMIN SERPL ELPH-MCNC: 4.1 G/DL
ALP BLD-CCNC: 96 U/L
ALT SERPL-CCNC: 47 U/L
ANION GAP SERPL CALC-SCNC: 16 MMOL/L
AST SERPL-CCNC: 36 U/L
BASOPHILS # BLD AUTO: 0.07 K/UL
BASOPHILS NFR BLD AUTO: 0.8 %
BILIRUB SERPL-MCNC: 0.2 MG/DL
BUN SERPL-MCNC: 24 MG/DL
CALCIUM SERPL-MCNC: 9.1 MG/DL
CHLORIDE SERPL-SCNC: 101 MMOL/L
CO2 SERPL-SCNC: 23 MMOL/L
CREAT SERPL-MCNC: 1.85 MG/DL
EOSINOPHIL # BLD AUTO: 0.16 K/UL
EOSINOPHIL NFR BLD AUTO: 1.8 %
GLUCOSE SERPL-MCNC: 104 MG/DL
HCT VFR BLD CALC: 35.9 %
HGB BLD-MCNC: 11.1 G/DL
IMM GRANULOCYTES NFR BLD AUTO: 1 %
LYMPHOCYTES # BLD AUTO: 1.66 K/UL
LYMPHOCYTES NFR BLD AUTO: 18.2 %
MAN DIFF?: NORMAL
MCHC RBC-ENTMCNC: 29.9 PG
MCHC RBC-ENTMCNC: 30.9 GM/DL
MCV RBC AUTO: 96.8 FL
MONOCYTES # BLD AUTO: 0.7 K/UL
MONOCYTES NFR BLD AUTO: 7.7 %
NEUTROPHILS # BLD AUTO: 6.42 K/UL
NEUTROPHILS NFR BLD AUTO: 70.5 %
PLATELET # BLD AUTO: 451 K/UL
POTASSIUM SERPL-SCNC: 5.1 MMOL/L
PROT SERPL-MCNC: 7.1 G/DL
RBC # BLD: 3.71 M/UL
RBC # FLD: 13.9 %
SODIUM SERPL-SCNC: 140 MMOL/L
WBC # FLD AUTO: 9.1 K/UL

## 2021-11-12 ENCOUNTER — NON-APPOINTMENT (OUTPATIENT)
Age: 66
End: 2021-11-12

## 2021-11-12 LAB — SURGICAL PATHOLOGY STUDY: SIGNIFICANT CHANGE UP

## 2021-11-17 PROCEDURE — 80048 BASIC METABOLIC PNL TOTAL CA: CPT

## 2021-11-17 PROCEDURE — P9016: CPT

## 2021-11-17 PROCEDURE — 84295 ASSAY OF SERUM SODIUM: CPT

## 2021-11-17 PROCEDURE — 36415 COLL VENOUS BLD VENIPUNCTURE: CPT

## 2021-11-17 PROCEDURE — 86901 BLOOD TYPING SEROLOGIC RH(D): CPT

## 2021-11-17 PROCEDURE — C9399: CPT

## 2021-11-17 PROCEDURE — 84100 ASSAY OF PHOSPHORUS: CPT

## 2021-11-17 PROCEDURE — 86923 COMPATIBILITY TEST ELECTRIC: CPT

## 2021-11-17 PROCEDURE — 88307 TISSUE EXAM BY PATHOLOGIST: CPT

## 2021-11-17 PROCEDURE — 83735 ASSAY OF MAGNESIUM: CPT

## 2021-11-17 PROCEDURE — 82962 GLUCOSE BLOOD TEST: CPT

## 2021-11-17 PROCEDURE — 86900 BLOOD TYPING SEROLOGIC ABO: CPT

## 2021-11-17 PROCEDURE — 36430 TRANSFUSION BLD/BLD COMPNT: CPT

## 2021-11-17 PROCEDURE — 85018 HEMOGLOBIN: CPT

## 2021-11-17 PROCEDURE — 84132 ASSAY OF SERUM POTASSIUM: CPT

## 2021-11-17 PROCEDURE — 82330 ASSAY OF CALCIUM: CPT

## 2021-11-17 PROCEDURE — 86850 RBC ANTIBODY SCREEN: CPT

## 2021-11-17 PROCEDURE — C1889: CPT

## 2021-11-17 PROCEDURE — 85025 COMPLETE CBC W/AUTO DIFF WBC: CPT

## 2021-11-17 PROCEDURE — 85027 COMPLETE CBC AUTOMATED: CPT

## 2021-11-24 ENCOUNTER — APPOINTMENT (OUTPATIENT)
Dept: HEMATOLOGY ONCOLOGY | Facility: CLINIC | Age: 66
End: 2021-11-24
Payer: MEDICAID

## 2021-11-24 VITALS
HEART RATE: 74 BPM | OXYGEN SATURATION: 96 % | TEMPERATURE: 97.8 F | HEIGHT: 68 IN | SYSTOLIC BLOOD PRESSURE: 139 MMHG | WEIGHT: 155 LBS | BODY MASS INDEX: 23.49 KG/M2 | RESPIRATION RATE: 18 BRPM | DIASTOLIC BLOOD PRESSURE: 82 MMHG

## 2021-11-24 PROCEDURE — 99205 OFFICE O/P NEW HI 60 MIN: CPT

## 2021-11-24 NOTE — REVIEW OF SYSTEMS
[Negative] : Allergic/Immunologic [FreeTextEntry7] : mild discomfort on the surgery area, healed well

## 2021-11-24 NOTE — ASSESSMENT
[FreeTextEntry1] : Mr. 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 Garrett\par \par 10/12/21 CT kidney w/ and w/o at [facility]\par -10.1 x 7.4 x 7.4cm heterogeneous enhancing partially exophytic mass mid to lower pole of right kidney with extensive neovascularity and extension to the renal pelvis at the lower pole. No hydronephrosis. Filling defect within right main renal vein extending into infrahepatic IVC consistent with thrombosis. Few associated collateral vessels noted in right renal fossa region. No LAD.  \par \par 10/21/21 Urine Cytology\par NEGATIVE FOR HIGH GRADE UROTHELIAL CARCINOMA\par Squamous cells, urothelial cells, red blood cells, and degenerated cells.\par \par 10/28/21 CT Chest\par No suspicious pulmonary finding.\par Persistent thrombus within the right renal vein extending into the IVC.\par \par 10/28/21 MRI Abdomen\par 1. Renal mass in right lower pole involving renal pelvis suspicious for renal cell carcinoma, 8.2 cm.\par 2.Tumor thrombus within right renal vein with extension into infra hepatic IVC.\par \par 11/2/21\par Surgical Pathology Report\par Final Diagnosis\par Kidney, right; radical nephrectomy:\par - Clear cell renal cell carcinoma, Grade 3 - See Note and Synoptic\par  - Carcinoma thrombus involving the renal vein.\par - Carcinoma infiltrates perinephric renal fat.\par - Margins negative for carcinoma.\par \par Synoptic Summary\par KIDNEY: Nephrectomy\par SPECIMEN\par Procedure:  Radical nephrectomy\par Specimen Laterality:  Right\par TUMOR\par Histologic Type:  Clear cell renal cell carcinoma\par Histologic Grade:  G3: Nucleoli conspicuous and eosinophilic at 100x\par magnification\par Tumor Size: Greatest Dimension (Centimeters) -   7.5 cm\par Tumor Focality:  Unifocal\par Tumor Extension:  Tumor extension into perinephric tissue (beyond renal capsule), Tumor extension into major vein (renal vein or its segmental branches, inferior vena cava)\par Sarcomatoid Features:  Not identified\par Rhabdoid Features:  Not identified\par Tumor Necrosis:  Present\par Lymphovascular Invasion:  Present\par MARGINS\par Margins: Uninvolved by invasive carcinoma on this specimen (Clinically, tumor thrombus involving inferior vena cava removed at time of surgery)\par LYMPH NODES\par Regional Lymph Nodes:  No lymph nodes submitted or found\par Primary Tumor (pT):  pT3b\par Regional Lymph Nodes (pN):  pNX\par ADDITIONAL FINDINGS\par Pathologic Findings in Nonneoplastic Kidney:   None identified\par \par Plan\par \par # Stage III  [id number] Renal cell carcinoma with IVC thrombosis, s/p Rt radical nephrectomy\par -CBC, CMP, CEA, TSH\par -Received covid vaccine x2. Recommend booster and flu shot\par -Will request PD-L1\par -will offer adjuvant Pembrolizumab x 1 year (see trial data below)\par -He was provided with patient educational materials. Risks, benefits and alternatives were discussed. He was also provided with the opportunity to asks questions. He signed consent for the treatment.\par - Will start pembro 200mg  q 3 weeks on 12/7/21. \par \par RTC on 12/14\par \par Trial data:\par In a double-blind, placebo-controlled phase III trial ([id number]), 994 patients with histologically confirmed clear cell renal carcinoma treated with nephrectomy were randomly assigned to either pembrolizumab 200 mg every three weeks for up to one year (17 cycles) or placebo. Tumors were classified as intermediate-high risk disease (ie, pT2, grade 4 or sarcomatoid, N0), high-risk disease (pT3, any grade, N0; or any pT, any grade, node positive), or M1 with no evidence of disease (ie, resection of all oligometastatic sites with no evidence of disease within one year of nephrectomy). Patients initiated adjuvant therapy within 12 weeks of undergoing nephrectomy.\par \par At median follow-up of 24 months, pembrolizumab improved DFS compared with placebo (two-year DFS 77 versus 68 percent, HR 0.68, 95% CI 0.53-0.87) [8]. DFS was consistent across all subgroups. Adjuvant pembrolizumab also demonstrated a statistically significant improvement in OS (two-year OS 97 versus 94 percent, HR 0.54, 95% CI 0.30-0.96), and further follow-up of OS is ongoing. Grade =3 treatment-related toxicity rates were higher with pembrolizumab than placebo (19 versus 1 percent), with no new toxicity profiles noted.

## 2021-11-24 NOTE — PHYSICAL EXAM
[Restricted in physically strenuous activity but ambulatory and able to carry out work of a light or sedentary nature] : Status 1- Restricted in physically strenuous activity but ambulatory and able to carry out work of a light or sedentary nature, e.g., light house work, office work [Normal] : affect appropriate [de-identified] : healed wound

## 2021-11-24 NOTE — HISTORY OF PRESENT ILLNESS
[de-identified] : Mr. 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 Garrett\par \par ONC Hx:\par Mr. Mills initially was seen by Dr. Brady on 10/18/21 for evaluation of renal mass. His PCP ordered US abdomen for health maintenance and he was found to have 10cm right renal mass.  He does not have any symptoms.  He had CT Kidney performed on 10/12/21 which showed 10.1 x 7.4 x 7.4cm heterogeneous enhancing partially exophytic mass mid to lower pole of right kidney with extensive neovascularity and extension to the renal pelvis at the lower pole,  infrahepatic IVC thrombosis.  He was then referred to Dr. Garrett on 10/20/21 who ordered CT chest and MRI abdomen.  CT Chest on 10/28/21 showed  no suspicious pulmonary finding, MRI abdomen on 10/28/21 with renal mass in right lower pole involving renal pelvis suspicious for renal cell carcinoma, 8.2 cm,  tumor thrombus within right renal vein with extension into infra hepatic IVC. He underwent right radical nephrectomy with  caval thrombus on 11/2/21, pathology showed [id number] Clear cell renal cell carcinoma. \par \par 10/12/21 CT kidney w/ and w/o at [facility]\par -10.1 x 7.4 x 7.4cm heterogeneous enhancing partially exophytic mass mid to lower pole of right kidney with extensive neovascularity and extension to the renal pelvis at the lower pole. No hydronephrosis. Filling defect within right main renal vein extending into infrahepatic IVC consistent with thrombosis. Few associated collateral vessels noted in right renal fossa region. No LAD.  \par \par 10/21/21 Urine Cytology\par NEGATIVE FOR HIGH GRADE UROTHELIAL CARCINOMA\par Squamous cells, urothelial cells, red blood cells, and degenerated cells.\par \par 10/28/21 CT Chest\par No suspicious pulmonary finding.\par Persistent thrombus within the right renal vein extending into the IVC.\par \par 10/28/21 MRI Abdomen\par 1. Renal mass in right lower pole involving renal pelvis suspicious for renal cell carcinoma, 8.2 cm.\par 2.Tumor thrombus within right renal vein with extension into infra hepatic IVC.\par \par 11/2/21\par Surgical Pathology Report\par Final Diagnosis\par Kidney, right; radical nephrectomy:\par - Clear cell renal cell carcinoma, Grade 3 - See Note and Synoptic\par  - Carcinoma thrombus involving the renal vein.\par - Carcinoma infiltrates perinephric renal fat.\par - Margins negative for carcinoma.\par \par Synoptic Summary\par KIDNEY: Nephrectomy\par SPECIMEN\par Procedure:  Radical nephrectomy\par Specimen Laterality:  Right\par TUMOR\par Histologic Type:  Clear cell renal cell carcinoma\par Histologic Grade:  G3: Nucleoli conspicuous and eosinophilic at 100x\par magnification\par Tumor Size: Greatest Dimension (Centimeters) -   7.5 cm\par Tumor Focality:  Unifocal\par Tumor Extension:  Tumor extension into perinephric tissue (beyond renal capsule), Tumor extension into major vein (renal vein or its segmental branches, inferior vena cava)\par Sarcomatoid Features:  Not identified\par Rhabdoid Features:  Not identified\par Tumor Necrosis:  Present\par Lymphovascular Invasion:  Present\par MARGINS\par Margins: Uninvolved by invasive carcinoma on this specimen (Clinically, tumor thrombus involving inferior vena cava removed at time of surgery)\par LYMPH NODES\par Regional Lymph Nodes:  No lymph nodes submitted or found\par Primary Tumor (pT):  pT3b\par Regional Lymph Nodes (pN):  pNX\par ADDITIONAL FINDINGS\par Pathologic Findings in Nonneoplastic Kidney:   None identified [de-identified] : He presents to clinic today for an initial consultation. Reports intermittent some discomfort on the surgery area. Otherwise he feels fine w/o any problems. Denies unintentional weight loss, decreased appetite, N/V/C/D, fever, chest pain, or abdominal pain.

## 2021-12-06 RX ORDER — PEMBROLIZUMAB 25 MG/ML
400 INJECTION, SOLUTION INTRAVENOUS ONCE
Refills: 0 | Status: COMPLETED | OUTPATIENT
Start: 2021-12-28 | End: 2021-12-28

## 2021-12-07 ENCOUNTER — APPOINTMENT (OUTPATIENT)
Age: 66
End: 2021-12-07

## 2021-12-07 ENCOUNTER — OUTPATIENT (OUTPATIENT)
Dept: OUTPATIENT SERVICES | Facility: HOSPITAL | Age: 66
LOS: 1 days | End: 2021-12-07
Payer: MEDICARE

## 2021-12-07 VITALS
TEMPERATURE: 97 F | HEART RATE: 67 BPM | HEIGHT: 67.99 IN | SYSTOLIC BLOOD PRESSURE: 121 MMHG | DIASTOLIC BLOOD PRESSURE: 74 MMHG | WEIGHT: 154.98 LBS | OXYGEN SATURATION: 98 % | RESPIRATION RATE: 16 BRPM

## 2021-12-07 VITALS
DIASTOLIC BLOOD PRESSURE: 74 MMHG | RESPIRATION RATE: 16 BRPM | HEART RATE: 60 BPM | OXYGEN SATURATION: 99 % | SYSTOLIC BLOOD PRESSURE: 116 MMHG | TEMPERATURE: 98 F

## 2021-12-07 DIAGNOSIS — Z90.49 ACQUIRED ABSENCE OF OTHER SPECIFIED PARTS OF DIGESTIVE TRACT: Chronic | ICD-10-CM

## 2021-12-07 DIAGNOSIS — Z98.49 CATARACT EXTRACTION STATUS, UNSPECIFIED EYE: Chronic | ICD-10-CM

## 2021-12-07 DIAGNOSIS — Z98.890 OTHER SPECIFIED POSTPROCEDURAL STATES: Chronic | ICD-10-CM

## 2021-12-07 DIAGNOSIS — C64.2 MALIGNANT NEOPLASM OF LEFT KIDNEY, EXCEPT RENAL PELVIS: ICD-10-CM

## 2021-12-07 LAB
25(OH)D3 SERPL-MCNC: 23.7 NG/ML
ALBUMIN SERPL ELPH-MCNC: 3.5 G/DL — SIGNIFICANT CHANGE UP (ref 3.3–5)
ALBUMIN SERPL ELPH-MCNC: 4.4 G/DL
ALP BLD-CCNC: 96 U/L
ALP SERPL-CCNC: 78 U/L — SIGNIFICANT CHANGE UP (ref 40–120)
ALT FLD-CCNC: 17 U/L — SIGNIFICANT CHANGE UP (ref 10–45)
ALT SERPL-CCNC: 24 U/L
ANION GAP SERPL CALC-SCNC: -3 MMOL/L — LOW (ref 5–17)
ANION GAP SERPL CALC-SCNC: 16 MMOL/L
AST SERPL-CCNC: 22 U/L
AST SERPL-CCNC: 25 U/L — SIGNIFICANT CHANGE UP (ref 10–40)
BASOPHILS # BLD AUTO: 0.07 K/UL
BASOPHILS NFR BLD AUTO: 1.1 %
BILIRUB SERPL-MCNC: 0.3 MG/DL
BILIRUB SERPL-MCNC: 0.7 MG/DL — SIGNIFICANT CHANGE UP (ref 0.2–1.2)
BUN SERPL-MCNC: 23 MG/DL — SIGNIFICANT CHANGE UP (ref 7–23)
BUN SERPL-MCNC: 24 MG/DL
CALCIUM SERPL-MCNC: 8.9 MG/DL — SIGNIFICANT CHANGE UP (ref 8.4–10.5)
CALCIUM SERPL-MCNC: 9.7 MG/DL
CHLORIDE SERPL-SCNC: 104 MMOL/L
CHLORIDE SERPL-SCNC: 112 MMOL/L — HIGH (ref 96–108)
CO2 SERPL-SCNC: 20 MMOL/L
CO2 SERPL-SCNC: 28 MMOL/L — SIGNIFICANT CHANGE UP (ref 22–31)
CREAT SERPL-MCNC: 1.76 MG/DL
CREAT SERPL-MCNC: 2.1 MG/DL — HIGH (ref 0.5–1.3)
EOSINOPHIL # BLD AUTO: 0.23 K/UL
EOSINOPHIL NFR BLD AUTO: 3.7 %
FERRITIN SERPL-MCNC: 127 NG/ML
GLUCOSE SERPL-MCNC: 130 MG/DL — HIGH (ref 70–99)
GLUCOSE SERPL-MCNC: 61 MG/DL
HCT VFR BLD CALC: 35.8 % — LOW (ref 39–50)
HCT VFR BLD CALC: 37 %
HGB BLD-MCNC: 11.9 G/DL — LOW (ref 13–17)
HGB BLD-MCNC: 12 G/DL
IMM GRANULOCYTES NFR BLD AUTO: 0.3 %
IRON SATN MFR SERPL: 33 %
IRON SERPL-MCNC: 103 UG/DL
LYMPHOCYTES # BLD AUTO: 1.18 K/UL
LYMPHOCYTES # BLD AUTO: 1.2 K/UL — SIGNIFICANT CHANGE UP (ref 1–3.3)
LYMPHOCYTES # BLD AUTO: 25.2 % — SIGNIFICANT CHANGE UP (ref 13–44)
LYMPHOCYTES NFR BLD AUTO: 18.8 %
MAN DIFF?: NORMAL
MCHC RBC-ENTMCNC: 30.4 PG — SIGNIFICANT CHANGE UP (ref 27–34)
MCHC RBC-ENTMCNC: 30.8 PG
MCHC RBC-ENTMCNC: 32.4 GM/DL
MCHC RBC-ENTMCNC: 33.2 GM/DL — SIGNIFICANT CHANGE UP (ref 32–36)
MCV RBC AUTO: 91.3 FL — SIGNIFICANT CHANGE UP (ref 80–100)
MCV RBC AUTO: 94.9 FL
MONOCYTES # BLD AUTO: 0.41 K/UL
MONOCYTES NFR BLD AUTO: 6.5 %
NEUTROPHILS # BLD AUTO: 3.2 K/UL — SIGNIFICANT CHANGE UP (ref 1.8–7.4)
NEUTROPHILS # BLD AUTO: 4.36 K/UL
NEUTROPHILS NFR BLD AUTO: 66.9 % — SIGNIFICANT CHANGE UP (ref 43–77)
NEUTROPHILS NFR BLD AUTO: 69.6 %
PLATELET # BLD AUTO: 230 K/UL — SIGNIFICANT CHANGE UP (ref 150–400)
PLATELET # BLD AUTO: 332 K/UL
POTASSIUM SERPL-MCNC: 4.6 MMOL/L — SIGNIFICANT CHANGE UP (ref 3.5–5.3)
POTASSIUM SERPL-SCNC: 4.5 MMOL/L
POTASSIUM SERPL-SCNC: 4.6 MMOL/L — SIGNIFICANT CHANGE UP (ref 3.5–5.3)
PROT SERPL-MCNC: 7 G/DL — SIGNIFICANT CHANGE UP (ref 6–8.3)
PROT SERPL-MCNC: 7.7 G/DL
RBC # BLD: 3.9 M/UL
RBC # BLD: 3.92 M/UL — LOW (ref 4.2–5.8)
RBC # FLD: 13.1 % — SIGNIFICANT CHANGE UP (ref 10.3–14.5)
RBC # FLD: 13.8 %
SODIUM SERPL-SCNC: 137 MMOL/L — SIGNIFICANT CHANGE UP (ref 135–145)
SODIUM SERPL-SCNC: 140 MMOL/L
T4 AB SER-ACNC: 5.5 UG/DL — SIGNIFICANT CHANGE UP (ref 4.5–11.7)
TIBC SERPL-MCNC: 314 UG/DL
TSH SERPL-ACNC: 2.4 UIU/ML
TSH SERPL-MCNC: 2.42 UIU/ML — SIGNIFICANT CHANGE UP (ref 0.27–4.2)
UIBC SERPL-MCNC: 211 UG/DL
WBC # BLD: 4.8 K/UL — SIGNIFICANT CHANGE UP (ref 3.8–10.5)
WBC # FLD AUTO: 4.8 K/UL — SIGNIFICANT CHANGE UP (ref 3.8–10.5)
WBC # FLD AUTO: 6.27 K/UL

## 2021-12-07 PROCEDURE — 84436 ASSAY OF TOTAL THYROXINE: CPT

## 2021-12-07 PROCEDURE — 96413 CHEMO IV INFUSION 1 HR: CPT

## 2021-12-07 PROCEDURE — 85025 COMPLETE CBC W/AUTO DIFF WBC: CPT

## 2021-12-07 PROCEDURE — 80053 COMPREHEN METABOLIC PANEL: CPT

## 2021-12-07 PROCEDURE — 36415 COLL VENOUS BLD VENIPUNCTURE: CPT

## 2021-12-07 PROCEDURE — 84443 ASSAY THYROID STIM HORMONE: CPT

## 2021-12-07 RX ORDER — PEMBROLIZUMAB 25 MG/ML
200 INJECTION, SOLUTION INTRAVENOUS ONCE
Refills: 0 | Status: COMPLETED | OUTPATIENT
Start: 2021-12-07 | End: 2021-12-07

## 2021-12-07 RX ADMIN — PEMBROLIZUMAB 200 MILLIGRAM(S): 25 INJECTION, SOLUTION INTRAVENOUS at 14:22

## 2021-12-07 RX ADMIN — PEMBROLIZUMAB 216 MILLIGRAM(S): 25 INJECTION, SOLUTION INTRAVENOUS at 13:40

## 2021-12-14 ENCOUNTER — APPOINTMENT (OUTPATIENT)
Dept: HEMATOLOGY ONCOLOGY | Facility: CLINIC | Age: 66
End: 2021-12-14
Payer: MEDICAID

## 2021-12-14 ENCOUNTER — LABORATORY RESULT (OUTPATIENT)
Age: 66
End: 2021-12-14

## 2021-12-14 VITALS
WEIGHT: 156 LBS | HEIGHT: 68 IN | OXYGEN SATURATION: 96 % | BODY MASS INDEX: 23.64 KG/M2 | HEART RATE: 73 BPM | SYSTOLIC BLOOD PRESSURE: 124 MMHG | DIASTOLIC BLOOD PRESSURE: 82 MMHG | TEMPERATURE: 98 F | RESPIRATION RATE: 18 BRPM

## 2021-12-14 PROCEDURE — 99214 OFFICE O/P EST MOD 30 MIN: CPT

## 2021-12-14 NOTE — HISTORY OF PRESENT ILLNESS
[de-identified] : Mr. 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 Garrett. Started C1 Pembro on 12/7/21. Today here for f/u D8. \par \par ONC Hx:\par Mr. Mills initially was seen by Dr. Brady on 10/18/21 for evaluation of renal mass. His PCP ordered US abdomen for health maintenance and he was found to have 10cm right renal mass.  He does not have any symptoms.  He had CT Kidney performed on 10/12/21 which showed 10.1 x 7.4 x 7.4cm heterogeneous enhancing partially exophytic mass mid to lower pole of right kidney with extensive neovascularity and extension to the renal pelvis at the lower pole,  infrahepatic IVC thrombosis.  He was then referred to Dr. Garrett on 10/20/21 who ordered CT chest and MRI abdomen.  CT Chest on 10/28/21 showed  no suspicious pulmonary finding, MRI abdomen on 10/28/21 with renal mass in right lower pole involving renal pelvis suspicious for renal cell carcinoma, 8.2 cm,  tumor thrombus within right renal vein with extension into infra hepatic IVC. He underwent right radical nephrectomy with  caval thrombus on 11/2/21, pathology showed [id number] Clear cell renal cell carcinoma. \par \par 10/12/21 CT kidney w/ and w/o at [facility]\par -10.1 x 7.4 x 7.4cm heterogeneous enhancing partially exophytic mass mid to lower pole of right kidney with extensive neovascularity and extension to the renal pelvis at the lower pole. No hydronephrosis. Filling defect within right main renal vein extending into infrahepatic IVC consistent with thrombosis. Few associated collateral vessels noted in right renal fossa region. No LAD.  \par \par 10/21/21 Urine Cytology\par NEGATIVE FOR HIGH GRADE UROTHELIAL CARCINOMA\par Squamous cells, urothelial cells, red blood cells, and degenerated cells.\par \par 10/28/21 CT Chest\par No suspicious pulmonary finding.\par Persistent thrombus within the right renal vein extending into the IVC.\par \par 10/28/21 MRI Abdomen\par 1. Renal mass in right lower pole involving renal pelvis suspicious for renal cell carcinoma, 8.2 cm.\par 2.Tumor thrombus within right renal vein with extension into infra hepatic IVC.\par \par 11/2/21\par Surgical Pathology Report\par Final Diagnosis\par Kidney, right; radical nephrectomy:\par - Clear cell renal cell carcinoma, Grade 3 - See Note and Synoptic\par  - Carcinoma thrombus involving the renal vein.\par - Carcinoma infiltrates perinephric renal fat.\par - Margins negative for carcinoma.\par \par Synoptic Summary\par KIDNEY: Nephrectomy\par SPECIMEN\par Procedure:  Radical nephrectomy\par Specimen Laterality:  Right\par TUMOR\par Histologic Type:  Clear cell renal cell carcinoma\par Histologic Grade:  G3: Nucleoli conspicuous and eosinophilic at 100x\par magnification\par Tumor Size: Greatest Dimension (Centimeters) -   7.5 cm\par Tumor Focality:  Unifocal\par Tumor Extension:  Tumor extension into perinephric tissue (beyond renal capsule), Tumor extension into major vein (renal vein or its segmental branches, inferior vena cava)\par Sarcomatoid Features:  Not identified\par Rhabdoid Features:  Not identified\par Tumor Necrosis:  Present\par Lymphovascular Invasion:  Present\par MARGINS\par Margins: Uninvolved by invasive carcinoma on this specimen (Clinically, tumor thrombus involving inferior vena cava removed at time of surgery)\par LYMPH NODES\par Regional Lymph Nodes:  No lymph nodes submitted or found\par Primary Tumor (pT):  pT3b\par Regional Lymph Nodes (pN):  pNX\par ADDITIONAL FINDINGS\par Pathologic Findings in Nonneoplastic Kidney:   None identified [de-identified] : He presents to clinic today for a f/u. Reports dizziness the last few days and increased urinary frequency. Denies unintentional weight loss, decreased appetite, N/V/C/D, fever, chest pain, or abdominal pain.

## 2021-12-14 NOTE — ASSESSMENT
[FreeTextEntry1] : Mr. 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 Garrett. Started C1 Pembro on 12/7/21, Today here for f/u D8.\par \par 10/12/21 CT kidney w/ and w/o at [facility]\par -10.1 x 7.4 x 7.4cm heterogeneous enhancing partially exophytic mass mid to lower pole of right kidney with extensive neovascularity and extension to the renal pelvis at the lower pole. No hydronephrosis. Filling defect within right main renal vein extending into infrahepatic IVC consistent with thrombosis. Few associated collateral vessels noted in right renal fossa region. No LAD.  \par \par 10/21/21 Urine Cytology\par NEGATIVE FOR HIGH GRADE UROTHELIAL CARCINOMA\par Squamous cells, urothelial cells, red blood cells, and degenerated cells.\par \par 10/28/21 CT Chest\par No suspicious pulmonary finding.\par Persistent thrombus within the right renal vein extending into the IVC.\par \par 10/28/21 MRI Abdomen\par 1. Renal mass in right lower pole involving renal pelvis suspicious for renal cell carcinoma, 8.2 cm.\par 2.Tumor thrombus within right renal vein with extension into infra hepatic IVC.\par \par 11/2/21\par Surgical Pathology Report\par Final Diagnosis\par Kidney, right; radical nephrectomy:\par - Clear cell renal cell carcinoma, Grade 3 - See Note and Synoptic\par  - Carcinoma thrombus involving the renal vein.\par - Carcinoma infiltrates perinephric renal fat.\par - Margins negative for carcinoma.\par \par Synoptic Summary\par KIDNEY: Nephrectomy\par SPECIMEN\par Procedure:  Radical nephrectomy\par Specimen Laterality:  Right\par TUMOR\par Histologic Type:  Clear cell renal cell carcinoma\par Histologic Grade:  G3: Nucleoli conspicuous and eosinophilic at 100x\par magnification\par Tumor Size: Greatest Dimension (Centimeters) -   7.5 cm\par Tumor Focality:  Unifocal\par Tumor Extension:  Tumor extension into perinephric tissue (beyond renal capsule), Tumor extension into major vein (renal vein or its segmental branches, inferior vena cava)\par Sarcomatoid Features:  Not identified\par Rhabdoid Features:  Not identified\par Tumor Necrosis:  Present\par Lymphovascular Invasion:  Present\par MARGINS\par Margins: Uninvolved by invasive carcinoma on this specimen (Clinically, tumor thrombus involving inferior vena cava removed at time of surgery)\par LYMPH NODES\par Regional Lymph Nodes:  No lymph nodes submitted or found\par Primary Tumor (pT):  pT3b\par Regional Lymph Nodes (pN):  pNX\par ADDITIONAL FINDINGS\par Pathologic Findings in Nonneoplastic Kidney:   None identified\par \par Plan\par # Stage III  [id number] Renal cell carcinoma with IVC thrombosis, s/p Rt radical nephrectomy\par -CBC, CMP, TSH\par -Received covid vaccine x2. Will receive  booster tomorrow. \par -Will request PD-L1\par - Started C1 Pembro on 12/7/21. D/t trip to [place] in January, will consider giving pembro 400mg Q 6 weeks. C2 will be on 12/28. Will offer adjuvant Pembrolizumab x 1 year (see trial data below),\par \par # Vitamin D deficiency \par -Vit D 23.7\par -Started Vit D 14744 once weekly x 8 weeks\par \par # Elevated Cr\par - Will monitor closely. \par  \par RTC on 1/3/21. \par \par Trial data:\par In a double-blind, placebo-controlled phase III trial (KEYNOTE-564), 994 patients with histologically confirmed clear cell renal carcinoma treated with nephrectomy were randomly assigned to either pembrolizumab 200 mg every three weeks for up to one year (17 cycles) or placebo. Tumors were classified as intermediate-high risk disease (ie, pT2, grade 4 or sarcomatoid, N0), high-risk disease (pT3, any grade, N0; or any pT, any grade, node positive), or M1 with no evidence of disease (ie, resection of all oligometastatic sites with no evidence of disease within one year of nephrectomy). Patients initiated adjuvant therapy within 12 weeks of undergoing nephrectomy.\par \par At median follow-up of 24 months, pembrolizumab improved DFS compared with placebo (two-year DFS 77 versus 68 percent, HR 0.68, 95% CI 0.53-0.87) [8]. DFS was consistent across all subgroups. Adjuvant pembrolizumab also demonstrated a statistically significant improvement in OS (two-year OS 97 versus 94 percent, HR 0.54, 95% CI 0.30-0.96), and further follow-up of OS is ongoing. Grade =3 treatment-related toxicity rates were higher with pembrolizumab than placebo (19 versus 1 percent), with no new toxicity profiles noted.

## 2021-12-14 NOTE — PHYSICAL EXAM
[Restricted in physically strenuous activity but ambulatory and able to carry out work of a light or sedentary nature] : Status 1- Restricted in physically strenuous activity but ambulatory and able to carry out work of a light or sedentary nature, e.g., light house work, office work [Normal] : affect appropriate [de-identified] : healed wound

## 2021-12-14 NOTE — REVIEW OF SYSTEMS
[Negative] : Allergic/Immunologic [Dizziness] : dizziness [FreeTextEntry8] : increased urinary frequency

## 2021-12-28 ENCOUNTER — APPOINTMENT (OUTPATIENT)
Age: 66
End: 2021-12-28

## 2021-12-28 ENCOUNTER — OUTPATIENT (OUTPATIENT)
Dept: OUTPATIENT SERVICES | Facility: HOSPITAL | Age: 66
LOS: 1 days | End: 2021-12-28
Payer: MEDICARE

## 2021-12-28 VITALS
HEART RATE: 98 BPM | SYSTOLIC BLOOD PRESSURE: 112 MMHG | RESPIRATION RATE: 17 BRPM | TEMPERATURE: 99 F | OXYGEN SATURATION: 98 % | WEIGHT: 154.98 LBS | DIASTOLIC BLOOD PRESSURE: 71 MMHG | HEIGHT: 67.72 IN

## 2021-12-28 VITALS
RESPIRATION RATE: 17 BRPM | DIASTOLIC BLOOD PRESSURE: 75 MMHG | TEMPERATURE: 98 F | HEART RATE: 68 BPM | OXYGEN SATURATION: 99 % | SYSTOLIC BLOOD PRESSURE: 116 MMHG

## 2021-12-28 DIAGNOSIS — Z98.49 CATARACT EXTRACTION STATUS, UNSPECIFIED EYE: Chronic | ICD-10-CM

## 2021-12-28 DIAGNOSIS — C64.2 MALIGNANT NEOPLASM OF LEFT KIDNEY, EXCEPT RENAL PELVIS: ICD-10-CM

## 2021-12-28 DIAGNOSIS — Z98.890 OTHER SPECIFIED POSTPROCEDURAL STATES: Chronic | ICD-10-CM

## 2021-12-28 DIAGNOSIS — Z90.49 ACQUIRED ABSENCE OF OTHER SPECIFIED PARTS OF DIGESTIVE TRACT: Chronic | ICD-10-CM

## 2021-12-28 LAB
ALBUMIN SERPL ELPH-MCNC: 3.6 G/DL — SIGNIFICANT CHANGE UP (ref 3.3–5)
ALP SERPL-CCNC: 81 U/L — SIGNIFICANT CHANGE UP (ref 40–120)
ALT FLD-CCNC: 22 U/L — SIGNIFICANT CHANGE UP (ref 10–45)
ANION GAP SERPL CALC-SCNC: 6 MMOL/L — SIGNIFICANT CHANGE UP (ref 5–17)
AST SERPL-CCNC: 28 U/L — SIGNIFICANT CHANGE UP (ref 10–40)
BILIRUB SERPL-MCNC: 0.6 MG/DL — SIGNIFICANT CHANGE UP (ref 0.2–1.2)
BUN SERPL-MCNC: 23 MG/DL — SIGNIFICANT CHANGE UP (ref 7–23)
CALCIUM SERPL-MCNC: 9.8 MG/DL — SIGNIFICANT CHANGE UP (ref 8.4–10.5)
CHLORIDE SERPL-SCNC: 109 MMOL/L — HIGH (ref 96–108)
CO2 SERPL-SCNC: 26 MMOL/L — SIGNIFICANT CHANGE UP (ref 22–31)
CREAT SERPL-MCNC: 1.9 MG/DL — HIGH (ref 0.5–1.3)
GLUCOSE SERPL-MCNC: 136 MG/DL — HIGH (ref 70–99)
HCT VFR BLD CALC: 38.4 % — LOW (ref 39–50)
HGB BLD-MCNC: 13.1 G/DL — SIGNIFICANT CHANGE UP (ref 13–17)
LYMPHOCYTES # BLD AUTO: 1.6 K/UL — SIGNIFICANT CHANGE UP (ref 1–3.3)
LYMPHOCYTES # BLD AUTO: 23.8 % — SIGNIFICANT CHANGE UP (ref 13–44)
MCHC RBC-ENTMCNC: 30.5 PG — SIGNIFICANT CHANGE UP (ref 27–34)
MCHC RBC-ENTMCNC: 34.1 GM/DL — SIGNIFICANT CHANGE UP (ref 32–36)
MCV RBC AUTO: 89.5 FL — SIGNIFICANT CHANGE UP (ref 80–100)
NEUTROPHILS # BLD AUTO: 4.7 K/UL — SIGNIFICANT CHANGE UP (ref 1.8–7.4)
NEUTROPHILS NFR BLD AUTO: 68.2 % — SIGNIFICANT CHANGE UP (ref 43–77)
PLATELET # BLD AUTO: 253 K/UL — SIGNIFICANT CHANGE UP (ref 150–400)
POTASSIUM SERPL-MCNC: 4.4 MMOL/L — SIGNIFICANT CHANGE UP (ref 3.5–5.3)
POTASSIUM SERPL-SCNC: 4.4 MMOL/L — SIGNIFICANT CHANGE UP (ref 3.5–5.3)
PROT SERPL-MCNC: 7.1 G/DL — SIGNIFICANT CHANGE UP (ref 6–8.3)
RBC # BLD: 4.29 M/UL — SIGNIFICANT CHANGE UP (ref 4.2–5.8)
RBC # FLD: 12.6 % — SIGNIFICANT CHANGE UP (ref 10.3–14.5)
SODIUM SERPL-SCNC: 141 MMOL/L — SIGNIFICANT CHANGE UP (ref 135–145)
WBC # BLD: 6.9 K/UL — SIGNIFICANT CHANGE UP (ref 3.8–10.5)
WBC # FLD AUTO: 6.9 K/UL — SIGNIFICANT CHANGE UP (ref 3.8–10.5)

## 2021-12-28 PROCEDURE — 84436 ASSAY OF TOTAL THYROXINE: CPT

## 2021-12-28 PROCEDURE — 80053 COMPREHEN METABOLIC PANEL: CPT

## 2021-12-28 PROCEDURE — 36415 COLL VENOUS BLD VENIPUNCTURE: CPT

## 2021-12-28 PROCEDURE — 96413 CHEMO IV INFUSION 1 HR: CPT

## 2021-12-28 PROCEDURE — 84443 ASSAY THYROID STIM HORMONE: CPT

## 2021-12-28 PROCEDURE — 85025 COMPLETE CBC W/AUTO DIFF WBC: CPT

## 2021-12-28 RX ADMIN — PEMBROLIZUMAB 400 MILLIGRAM(S): 25 INJECTION, SOLUTION INTRAVENOUS at 14:50

## 2021-12-28 RX ADMIN — PEMBROLIZUMAB 232 MILLIGRAM(S): 25 INJECTION, SOLUTION INTRAVENOUS at 14:20

## 2021-12-29 LAB
T4 AB SER-ACNC: 5.73 UG/DL — SIGNIFICANT CHANGE UP (ref 4.5–11.7)
TSH SERPL-MCNC: 1.92 UIU/ML — SIGNIFICANT CHANGE UP (ref 0.27–4.2)

## 2022-01-04 ENCOUNTER — APPOINTMENT (OUTPATIENT)
Dept: HEMATOLOGY ONCOLOGY | Facility: CLINIC | Age: 67
End: 2022-01-04
Payer: MEDICAID

## 2022-01-04 ENCOUNTER — LABORATORY RESULT (OUTPATIENT)
Age: 67
End: 2022-01-04

## 2022-01-04 VITALS
DIASTOLIC BLOOD PRESSURE: 64 MMHG | BODY MASS INDEX: 23.34 KG/M2 | HEART RATE: 84 BPM | TEMPERATURE: 98 F | RESPIRATION RATE: 18 BRPM | WEIGHT: 154 LBS | OXYGEN SATURATION: 96 % | HEIGHT: 68 IN | SYSTOLIC BLOOD PRESSURE: 99 MMHG

## 2022-01-04 PROCEDURE — 99214 OFFICE O/P EST MOD 30 MIN: CPT | Mod: 25

## 2022-01-06 LAB
ALBUMIN SERPL ELPH-MCNC: 3.7 G/DL
ALP BLD-CCNC: 78 U/L
ALT SERPL-CCNC: 21 U/L
ANION GAP SERPL CALC-SCNC: 4 MMOL/L
AST SERPL-CCNC: 29 U/L
BILIRUB SERPL-MCNC: 0.7 MG/DL
BUN SERPL-MCNC: 18 MG/DL
CALCIUM SERPL-MCNC: 9.5 MG/DL
CHLORIDE SERPL-SCNC: 109 MMOL/L
CO2 SERPL-SCNC: 30 MMOL/L
CREAT SERPL-MCNC: 2.1 MG/DL
GLUCOSE SERPL-MCNC: 152 MG/DL
POTASSIUM SERPL-SCNC: 4.8 MMOL/L
PROT SERPL-MCNC: 7.5 G/DL
SODIUM SERPL-SCNC: 143 MMOL/L

## 2022-01-06 NOTE — HISTORY OF PRESENT ILLNESS
[de-identified] : Mr. 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 Garrett. Started C1 Pembro 200mg(q3 wks) on 12/7/21. s/p C2 Pembro 400mg(q6 wks) on 12/28/21. Today here for f/u D8. \par \par ONC Hx:\par Mr. Mills initially was seen by Dr. Brady on 10/18/21 for evaluation of renal mass. His PCP ordered US abdomen for health maintenance and he was found to have 10cm right renal mass.  He does not have any symptoms.  He had CT Kidney performed on 10/12/21 which showed 10.1 x 7.4 x 7.4cm heterogeneous enhancing partially exophytic mass mid to lower pole of right kidney with extensive neovascularity and extension to the renal pelvis at the lower pole,  infrahepatic IVC thrombosis.  He was then referred to Dr. Garrett on 10/20/21 who ordered CT chest and MRI abdomen.  CT Chest on 10/28/21 showed  no suspicious pulmonary finding, MRI abdomen on 10/28/21 with renal mass in right lower pole involving renal pelvis suspicious for renal cell carcinoma, 8.2 cm,  tumor thrombus within right renal vein with extension into infra hepatic IVC. He underwent right radical nephrectomy with  caval thrombus on 11/2/21, pathology showed [id number] Clear cell renal cell carcinoma. \par \par 10/12/21 CT kidney w/ and w/o at [facility]\par -10.1 x 7.4 x 7.4cm heterogeneous enhancing partially exophytic mass mid to lower pole of right kidney with extensive neovascularity and extension to the renal pelvis at the lower pole. No hydronephrosis. Filling defect within right main renal vein extending into infrahepatic IVC consistent with thrombosis. Few associated collateral vessels noted in right renal fossa region. No LAD.  \par \par 10/21/21 Urine Cytology\par NEGATIVE FOR HIGH GRADE UROTHELIAL CARCINOMA\par Squamous cells, urothelial cells, red blood cells, and degenerated cells.\par \par 10/28/21 CT Chest\par No suspicious pulmonary finding.\par Persistent thrombus within the right renal vein extending into the IVC.\par \par 10/28/21 MRI Abdomen\par 1. Renal mass in right lower pole involving renal pelvis suspicious for renal cell carcinoma, 8.2 cm.\par 2.Tumor thrombus within right renal vein with extension into infra hepatic IVC.\par \par 11/2/21\par Surgical Pathology Report\par Final Diagnosis\par Kidney, right; radical nephrectomy:\par - Clear cell renal cell carcinoma, Grade 3 - See Note and Synoptic\par  - Carcinoma thrombus involving the renal vein.\par - Carcinoma infiltrates perinephric renal fat.\par - Margins negative for carcinoma.\par \par Synoptic Summary\par KIDNEY: Nephrectomy\par SPECIMEN\par Procedure:  Radical nephrectomy\par Specimen Laterality:  Right\par TUMOR\par Histologic Type:  Clear cell renal cell carcinoma\par Histologic Grade:  G3: Nucleoli conspicuous and eosinophilic at 100x\par magnification\par Tumor Size: Greatest Dimension (Centimeters) -   7.5 cm\par Tumor Focality:  Unifocal\par Tumor Extension:  Tumor extension into perinephric tissue (beyond renal capsule), Tumor extension into major vein (renal vein or its segmental branches, inferior vena cava)\par Sarcomatoid Features:  Not identified\par Rhabdoid Features:  Not identified\par Tumor Necrosis:  Present\par Lymphovascular Invasion:  Present\par MARGINS\par Margins: Uninvolved by invasive carcinoma on this specimen (Clinically, tumor thrombus involving inferior vena cava removed at time of surgery)\par LYMPH NODES\par Regional Lymph Nodes:  No lymph nodes submitted or found\par Primary Tumor (pT):  pT3b\par Regional Lymph Nodes (pN):  pNX\par ADDITIONAL FINDINGS\par Pathologic Findings in Nonneoplastic Kidney:   None identified\par \par PD-L1 IMMUNOHISTOCHEMICAL ANALYSIS:\par Tumor Proportion Score: 2-5% / Positive\par  [de-identified] : He presents to clinic today for a f/u. Patient states he is feeling well.  Energy level is good. He is trying to watch his diet. No new symptoms. Denies decreased appetite, N/V/C/D, fever, chest pain, skin itching/rash or abdominal pain.

## 2022-01-06 NOTE — ASSESSMENT
[FreeTextEntry1] : Mr. 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 Garrett. Started C1 Pembro on 12/7/21, s/p C2 Pembro 400mg(q6 wks) on 12/28/21. Today here for f/u D8. \par \par 10/12/21 CT kidney w/ and w/o at [facility]\par -10.1 x 7.4 x 7.4cm heterogeneous enhancing partially exophytic mass mid to lower pole of right kidney with extensive neovascularity and extension to the renal pelvis at the lower pole. No hydronephrosis. Filling defect within right main renal vein extending into infrahepatic IVC consistent with thrombosis. Few associated collateral vessels noted in right renal fossa region. No LAD.  \par \par 10/21/21 Urine Cytology\par NEGATIVE FOR HIGH GRADE UROTHELIAL CARCINOMA\par Squamous cells, urothelial cells, red blood cells, and degenerated cells.\par \par 10/28/21 CT Chest\par No suspicious pulmonary finding.\par Persistent thrombus within the right renal vein extending into the IVC.\par \par 10/28/21 MRI Abdomen\par 1. Renal mass in right lower pole involving renal pelvis suspicious for renal cell carcinoma, 8.2 cm.\par 2.Tumor thrombus within right renal vein with extension into infra hepatic IVC.\par \par 11/2/21\par Surgical Pathology Report\par Final Diagnosis\par Kidney, right; radical nephrectomy:\par - Clear cell renal cell carcinoma, Grade 3 - See Note and Synoptic\par  - Carcinoma thrombus involving the renal vein.\par - Carcinoma infiltrates perinephric renal fat.\par - Margins negative for carcinoma.\par \par Synoptic Summary\par KIDNEY: Nephrectomy\par SPECIMEN\par Procedure:  Radical nephrectomy\par Specimen Laterality:  Right\par TUMOR\par Histologic Type:  Clear cell renal cell carcinoma\par Histologic Grade:  G3: Nucleoli conspicuous and eosinophilic at 100x\par magnification\par Tumor Size: Greatest Dimension (Centimeters) -   7.5 cm\par Tumor Focality:  Unifocal\par Tumor Extension:  Tumor extension into perinephric tissue (beyond renal capsule), Tumor extension into major vein (renal vein or its segmental branches, inferior vena cava)\par Sarcomatoid Features:  Not identified\par Rhabdoid Features:  Not identified\par Tumor Necrosis:  Present\par Lymphovascular Invasion:  Present\par MARGINS\par Margins: Uninvolved by invasive carcinoma on this specimen (Clinically, tumor thrombus involving inferior vena cava removed at time of surgery)\par LYMPH NODES\par Regional Lymph Nodes:  No lymph nodes submitted or found\par Primary Tumor (pT):  pT3b\par Regional Lymph Nodes (pN):  pNX\par ADDITIONAL FINDINGS\par Pathologic Findings in Nonneoplastic Kidney:   None identified\par \par PD-L1 IMMUNOHISTOCHEMICAL ANALYSIS:\par Tumor Proportion Score: 2-5% / Positive\par \par Plan\par # Stage III  [id number], PD-L1  2-5% / Positive  G3 Renal cell carcinoma with IVC thrombosis, s/p Rt radical nephrectomy\par -CBC, CMP, TSH\par -Received covid vaccine x3\par -Offer adjuvant Pembrolizumab x 1 year (see trial data below),\par -Started C1 Pembro on 12/7/21. d/t trip to [place] in January, pembro was increased to 400mg Q 6 weeks with C2 on 12/28/21. Tolerating tx well w/o new symptoms. He is planning on trip to [place] in March so wants to get 200mg  for C3 on 2/8/22 and 400mg for C4 on 2/28/22.  \par \par # Vitamin D deficiency \par -Vit D 23.7\par -ct  Vit D 07913 once weekly x 8 weeks\par \par # Elevated Cr\par - Cr trending up after the nephrectomy. \par - Cr 2.10 today. Will repeat CMP prior to C3 on 2/8/22 and monitor closely \par  \par Plan discussed with Dr. Gage. Repeat lab on 2/2/22, C3 on 2/8/22,  RTC with C4 on 2/28/22\par \par Trial data:\par In a double-blind, placebo-controlled phase III trial (KEYNOTE-564), 994 patients with histologically confirmed clear cell renal carcinoma treated with nephrectomy were randomly assigned to either pembrolizumab 200 mg every three weeks for up to one year (17 cycles) or placebo. Tumors were classified as intermediate-high risk disease (ie, pT2, grade 4 or sarcomatoid, N0), high-risk disease (pT3, any grade, N0; or any pT, any grade, node positive), or M1 with no evidence of disease (ie, resection of all oligometastatic sites with no evidence of disease within one year of nephrectomy). Patients initiated adjuvant therapy within 12 weeks of undergoing nephrectomy.\par \par At median follow-up of 24 months, pembrolizumab improved DFS compared with placebo (two-year DFS 77 versus 68 percent, HR 0.68, 95% CI 0.53-0.87) [8]. DFS was consistent across all subgroups. Adjuvant pembrolizumab also demonstrated a statistically significant improvement in OS (two-year OS 97 versus 94 percent, HR 0.54, 95% CI 0.30-0.96), and further follow-up of OS is ongoing. Grade =3 treatment-related toxicity rates were higher with pembrolizumab than placebo (19 versus 1 percent), with no new toxicity profiles noted.

## 2022-01-06 NOTE — REVIEW OF SYSTEMS
[Negative] : Allergic/Immunologic [Fatigue] : fatigue [Fever] : no fever [Chills] : no chills [Night Sweats] : no night sweats [Recent Change In Weight] : ~T no recent weight change [FreeTextEntry2] : mild fatigue

## 2022-01-19 ENCOUNTER — APPOINTMENT (OUTPATIENT)
Age: 67
End: 2022-01-19

## 2022-01-31 ENCOUNTER — RX RENEWAL (OUTPATIENT)
Age: 67
End: 2022-01-31

## 2022-02-02 ENCOUNTER — APPOINTMENT (OUTPATIENT)
Dept: HEMATOLOGY ONCOLOGY | Facility: CLINIC | Age: 67
End: 2022-02-02
Payer: MEDICAID

## 2022-02-02 ENCOUNTER — LABORATORY RESULT (OUTPATIENT)
Age: 67
End: 2022-02-02

## 2022-02-02 LAB
ALBUMIN SERPL ELPH-MCNC: 3.7 G/DL
ALP BLD-CCNC: 88 U/L
ALT SERPL-CCNC: 17 U/L
ANION GAP SERPL CALC-SCNC: 7 MMOL/L
AST SERPL-CCNC: 25 U/L
BILIRUB SERPL-MCNC: 0.8 MG/DL
BUN SERPL-MCNC: 16 MG/DL
CALCIUM SERPL-MCNC: 10.3 MG/DL
CHLORIDE SERPL-SCNC: 109 MMOL/L
CO2 SERPL-SCNC: 28 MMOL/L
CREAT SERPL-MCNC: 1.6 MG/DL
GLUCOSE SERPL-MCNC: 111 MG/DL
POTASSIUM SERPL-SCNC: 4.4 MMOL/L
PROT SERPL-MCNC: 7.3 G/DL
SODIUM SERPL-SCNC: 144 MMOL/L

## 2022-02-02 PROCEDURE — 36415 COLL VENOUS BLD VENIPUNCTURE: CPT

## 2022-02-08 ENCOUNTER — OUTPATIENT (OUTPATIENT)
Dept: OUTPATIENT SERVICES | Facility: HOSPITAL | Age: 67
LOS: 1 days | End: 2022-02-08
Payer: MEDICARE

## 2022-02-08 ENCOUNTER — APPOINTMENT (OUTPATIENT)
Dept: HEMATOLOGY ONCOLOGY | Facility: CLINIC | Age: 67
End: 2022-02-08

## 2022-02-08 ENCOUNTER — APPOINTMENT (OUTPATIENT)
Age: 67
End: 2022-02-08

## 2022-02-08 VITALS
WEIGHT: 149.91 LBS | DIASTOLIC BLOOD PRESSURE: 68 MMHG | RESPIRATION RATE: 181 BRPM | OXYGEN SATURATION: 98 % | HEIGHT: 66.93 IN | HEART RATE: 83 BPM | SYSTOLIC BLOOD PRESSURE: 102 MMHG | TEMPERATURE: 98 F

## 2022-02-08 DIAGNOSIS — Z98.49 CATARACT EXTRACTION STATUS, UNSPECIFIED EYE: Chronic | ICD-10-CM

## 2022-02-08 DIAGNOSIS — Z98.890 OTHER SPECIFIED POSTPROCEDURAL STATES: Chronic | ICD-10-CM

## 2022-02-08 DIAGNOSIS — Z90.49 ACQUIRED ABSENCE OF OTHER SPECIFIED PARTS OF DIGESTIVE TRACT: Chronic | ICD-10-CM

## 2022-02-08 DIAGNOSIS — C64.2 MALIGNANT NEOPLASM OF LEFT KIDNEY, EXCEPT RENAL PELVIS: ICD-10-CM

## 2022-02-08 LAB
ALBUMIN SERPL ELPH-MCNC: 3.6 G/DL — SIGNIFICANT CHANGE UP (ref 3.3–5)
ALP SERPL-CCNC: 85 U/L — SIGNIFICANT CHANGE UP (ref 40–120)
ALT FLD-CCNC: 13 U/L — SIGNIFICANT CHANGE UP (ref 10–45)
ANION GAP SERPL CALC-SCNC: 6 MMOL/L — SIGNIFICANT CHANGE UP (ref 5–17)
AST SERPL-CCNC: 21 U/L — SIGNIFICANT CHANGE UP (ref 10–40)
BILIRUB SERPL-MCNC: 0.6 MG/DL — SIGNIFICANT CHANGE UP (ref 0.2–1.2)
BUN SERPL-MCNC: 27 MG/DL — HIGH (ref 7–23)
CALCIUM SERPL-MCNC: 9.9 MG/DL — SIGNIFICANT CHANGE UP (ref 8.4–10.5)
CHLORIDE SERPL-SCNC: 109 MMOL/L — HIGH (ref 96–108)
CO2 SERPL-SCNC: 27 MMOL/L — SIGNIFICANT CHANGE UP (ref 22–31)
CREAT SERPL-MCNC: 2.3 MG/DL — HIGH (ref 0.5–1.3)
GLUCOSE SERPL-MCNC: 123 MG/DL — HIGH (ref 70–99)
HCT VFR BLD CALC: 38.2 % — LOW (ref 39–50)
HGB BLD-MCNC: 13 G/DL — SIGNIFICANT CHANGE UP (ref 13–17)
LYMPHOCYTES # BLD AUTO: 1.2 K/UL — SIGNIFICANT CHANGE UP (ref 1–3.3)
LYMPHOCYTES # BLD AUTO: 14.7 % — SIGNIFICANT CHANGE UP (ref 13–44)
MCHC RBC-ENTMCNC: 29.1 PG — SIGNIFICANT CHANGE UP (ref 27–34)
MCHC RBC-ENTMCNC: 34 GM/DL — SIGNIFICANT CHANGE UP (ref 32–36)
MCV RBC AUTO: 85.5 FL — SIGNIFICANT CHANGE UP (ref 80–100)
NEUTROPHILS # BLD AUTO: 6 K/UL — SIGNIFICANT CHANGE UP (ref 1.8–7.4)
NEUTROPHILS NFR BLD AUTO: 76.2 % — SIGNIFICANT CHANGE UP (ref 43–77)
PLATELET # BLD AUTO: 255 K/UL — SIGNIFICANT CHANGE UP (ref 150–400)
POTASSIUM SERPL-MCNC: 4.1 MMOL/L — SIGNIFICANT CHANGE UP (ref 3.5–5.3)
POTASSIUM SERPL-SCNC: 4.1 MMOL/L — SIGNIFICANT CHANGE UP (ref 3.5–5.3)
PROT SERPL-MCNC: 7.7 G/DL — SIGNIFICANT CHANGE UP (ref 6–8.3)
RBC # BLD: 4.47 M/UL — SIGNIFICANT CHANGE UP (ref 4.2–5.8)
RBC # FLD: 12.2 % — SIGNIFICANT CHANGE UP (ref 10.3–14.5)
SODIUM SERPL-SCNC: 142 MMOL/L — SIGNIFICANT CHANGE UP (ref 135–145)
T4 AB SER-ACNC: 9.17 UG/DL — SIGNIFICANT CHANGE UP (ref 4.5–11.7)
TSH SERPL-MCNC: <0.118 UIU/ML — LOW (ref 0.27–4.2)
WBC # BLD: 7.9 K/UL — SIGNIFICANT CHANGE UP (ref 3.8–10.5)
WBC # FLD AUTO: 7.9 K/UL — SIGNIFICANT CHANGE UP (ref 3.8–10.5)

## 2022-02-08 PROCEDURE — 80053 COMPREHEN METABOLIC PANEL: CPT

## 2022-02-08 PROCEDURE — 36415 COLL VENOUS BLD VENIPUNCTURE: CPT

## 2022-02-08 PROCEDURE — 96413 CHEMO IV INFUSION 1 HR: CPT

## 2022-02-08 PROCEDURE — 84436 ASSAY OF TOTAL THYROXINE: CPT

## 2022-02-08 PROCEDURE — 85025 COMPLETE CBC W/AUTO DIFF WBC: CPT

## 2022-02-08 PROCEDURE — 84443 ASSAY THYROID STIM HORMONE: CPT

## 2022-02-08 RX ORDER — PEMBROLIZUMAB 25 MG/ML
200 INJECTION, SOLUTION INTRAVENOUS ONCE
Refills: 0 | Status: COMPLETED | OUTPATIENT
Start: 2022-02-08 | End: 2022-02-08

## 2022-02-08 RX ADMIN — PEMBROLIZUMAB 200 MILLIGRAM(S): 25 INJECTION, SOLUTION INTRAVENOUS at 14:37

## 2022-02-08 RX ADMIN — PEMBROLIZUMAB 216 MILLIGRAM(S): 25 INJECTION, SOLUTION INTRAVENOUS at 14:07

## 2022-02-16 ENCOUNTER — APPOINTMENT (OUTPATIENT)
Dept: UROLOGY | Facility: CLINIC | Age: 67
End: 2022-02-16
Payer: MEDICAID

## 2022-02-16 VITALS — SYSTOLIC BLOOD PRESSURE: 118 MMHG | DIASTOLIC BLOOD PRESSURE: 79 MMHG | HEART RATE: 83 BPM | TEMPERATURE: 98.4 F

## 2022-02-16 PROCEDURE — 99214 OFFICE O/P EST MOD 30 MIN: CPT

## 2022-02-16 NOTE — ASSESSMENT
[FreeTextEntry1] : 65 yo male with 10cm right renal mass and IVC thrombus. \par Metastatic workup negative\par s/p radical nephrectomy 11/2/21\par Currently underdoing adjuvant pembro\par Reviewed labs\par Doing well \par Imaging at 6 months postop. f/u 3 months to schedule

## 2022-02-16 NOTE — HISTORY OF PRESENT ILLNESS
[FreeTextEntry1] : 65 yo [place] male with PMHx HTN presents for 10cm right renal mass. First discovered on abdominal US which he reports was ordered by his primary for health maintenance-he has no complaints. Previous abdominal surgery appendectomy. He has no family hx of  malignancies. He is a former smoker (8 pack year hx). Denies hematuria, CP, SOB, fevers, unexplained weight loss.\par \par Today he feels well overall except for a mild headache but he had 2 teeth removed last week and SHAHID has been relieved by advil.\par \par CT w/ and w/o at [facility] on 10/12/21: 10.1 x 7.4 x 7.4cm heterogeneous enhancing partially exophytic mass mid to lower pole of right kidney with extensive neovascularity and extension to the renal pelvis at the lower pole. No hydronephrosis. Filling defect within right main renal vein extending into infrahepatic IVC consistent with thrombosis. Few associated collateral vessels noted in right renal fossa region. No LAD.\par \par 11/10/21 Here for postop visit. Underwent right radical nephrectomy with caval thrombus 11/2. Path pending. Discharged home POD#2. Doing well. \par \par 2/16/22 Here for f/u. Undergoing adjuvant pempro with Dr. Gage. Doing well, tolerating treatment well. \par Recent labs reviewed\par Cr 1.6\par Alk phos 88\par normal LFTs\par normal CBC [None] : None

## 2022-02-16 NOTE — PHYSICAL EXAM
[General Appearance - Well Developed] : well developed [General Appearance - Well Nourished] : well nourished [Normal Appearance] : normal appearance [Well Groomed] : well groomed [General Appearance - In No Acute Distress] : no acute distress [Abdomen Soft] : soft [Abdomen Tenderness] : non-tender [Costovertebral Angle Tenderness] : no ~M costovertebral angle tenderness [FreeTextEntry1] : incision well healed

## 2022-02-16 NOTE — LETTER BODY
[Dear  ___] : Dear  [unfilled], [Courtesy Letter:] : I had the pleasure of seeing your patient, [unfilled], in my office today. [Please see my note below.] : Please see my note below. [Consult Closing:] : Thank you very much for allowing me to participate in the care of this patient.  If you have any questions, please do not hesitate to contact me. [Sincerely,] : Sincerely, [FreeTextEntry2] : Shirin Mills MD\par [address], [address]\[place] [address]  [FreeTextEntry3] : Jaden Garrett MD, FACS\par Urologic Oncology\par Department of Urology\par [facility]\par \par [employer] [facility] \par \par

## 2022-02-22 ENCOUNTER — APPOINTMENT (OUTPATIENT)
Dept: HEMATOLOGY ONCOLOGY | Facility: CLINIC | Age: 67
End: 2022-02-22
Payer: MEDICAID

## 2022-02-22 ENCOUNTER — LABORATORY RESULT (OUTPATIENT)
Age: 67
End: 2022-02-22

## 2022-02-22 PROCEDURE — 36415 COLL VENOUS BLD VENIPUNCTURE: CPT

## 2022-02-24 LAB
ALBUMIN SERPL ELPH-MCNC: 3.6 G/DL
ALP BLD-CCNC: 85 U/L
ALT SERPL-CCNC: 17 U/L
ANION GAP SERPL CALC-SCNC: 5 MMOL/L
AST SERPL-CCNC: 24 U/L
BILIRUB SERPL-MCNC: 0.6 MG/DL
BUN SERPL-MCNC: 16 MG/DL
CALCIUM SERPL-MCNC: 9 MG/DL
CHLORIDE SERPL-SCNC: 106 MMOL/L
CO2 SERPL-SCNC: 28 MMOL/L
CREAT SERPL-MCNC: 1.9 MG/DL
GLUCOSE SERPL-MCNC: 121 MG/DL
POTASSIUM SERPL-SCNC: 3.9 MMOL/L
PROT SERPL-MCNC: 7.1 G/DL
SODIUM SERPL-SCNC: 139 MMOL/L

## 2022-02-28 ENCOUNTER — LABORATORY RESULT (OUTPATIENT)
Age: 67
End: 2022-02-28

## 2022-02-28 ENCOUNTER — APPOINTMENT (OUTPATIENT)
Dept: HEMATOLOGY ONCOLOGY | Facility: CLINIC | Age: 67
End: 2022-02-28
Payer: MEDICAID

## 2022-02-28 DIAGNOSIS — R21 RASH AND OTHER NONSPECIFIC SKIN ERUPTION: ICD-10-CM

## 2022-02-28 LAB
ALBUMIN SERPL ELPH-MCNC: 3.5 G/DL
ALP BLD-CCNC: 86 U/L
ALT SERPL-CCNC: 14 U/L
ANION GAP SERPL CALC-SCNC: 4 MMOL/L
AST SERPL-CCNC: 23 U/L
BILIRUB SERPL-MCNC: 0.6 MG/DL
BUN SERPL-MCNC: 17 MG/DL
CALCIUM SERPL-MCNC: 9.6 MG/DL
CHLORIDE SERPL-SCNC: 108 MMOL/L
CO2 SERPL-SCNC: 29 MMOL/L
CREAT SERPL-MCNC: 1.6 MG/DL
EGFR: 47 ML/MIN/1.73M2
GLUCOSE SERPL-MCNC: 138 MG/DL
POTASSIUM SERPL-SCNC: 4.7 MMOL/L
PROT SERPL-MCNC: 7.7 G/DL
SODIUM SERPL-SCNC: 141 MMOL/L

## 2022-02-28 PROCEDURE — 99214 OFFICE O/P EST MOD 30 MIN: CPT | Mod: 25

## 2022-02-28 RX ORDER — ERGOCALCIFEROL 1.25 MG/1
1.25 MG CAPSULE, LIQUID FILLED ORAL
Qty: 8 | Refills: 0 | Status: DISCONTINUED | COMMUNITY
Start: 2021-12-07 | End: 2022-02-28

## 2022-02-28 NOTE — ASSESSMENT
[FreeTextEntry1] : Mr. 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 Garrett. Started C1 Pembro 200mg(q3 wks) on 12/7/21, C2 Pembro 400mg(q6 wks) on 12/28/21, C3 pembro 200mg on 2/8/22.  Today here for Pembro 400mg C4D1\par \par 10/12/21 CT kidney w/ and w/o at [facility]\par -10.1 x 7.4 x 7.4cm heterogeneous enhancing partially exophytic mass mid to lower pole of right kidney with extensive neovascularity and extension to the renal pelvis at the lower pole. No hydronephrosis. Filling defect within right main renal vein extending into infrahepatic IVC consistent with thrombosis. Few associated collateral vessels noted in right renal fossa region. No LAD.  \par \par 10/21/21 Urine Cytology\par NEGATIVE FOR HIGH GRADE UROTHELIAL CARCINOMA\par Squamous cells, urothelial cells, red blood cells, and degenerated cells.\par \par 10/28/21 CT Chest\par No suspicious pulmonary finding.\par Persistent thrombus within the right renal vein extending into the IVC.\par \par 10/28/21 MRI Abdomen\par 1. Renal mass in right lower pole involving renal pelvis suspicious for renal cell carcinoma, 8.2 cm.\par 2.Tumor thrombus within right renal vein with extension into infra hepatic IVC.\par \par 11/2/21\par Surgical Pathology Report\par Final Diagnosis\par Kidney, right; radical nephrectomy:\par - Clear cell renal cell carcinoma, Grade 3 - See Note and Synoptic\par  - Carcinoma thrombus involving the renal vein.\par - Carcinoma infiltrates perinephric renal fat.\par - Margins negative for carcinoma.\par \par Synoptic Summary\par KIDNEY: Nephrectomy\par SPECIMEN\par Procedure:  Radical nephrectomy\par Specimen Laterality:  Right\par TUMOR\par Histologic Type:  Clear cell renal cell carcinoma\par Histologic Grade:  G3: Nucleoli conspicuous and eosinophilic at 100x\par magnification\par Tumor Size: Greatest Dimension (Centimeters) -   7.5 cm\par Tumor Focality:  Unifocal\par Tumor Extension:  Tumor extension into perinephric tissue (beyond renal capsule), Tumor extension into major vein (renal vein or its segmental branches, inferior vena cava)\par Sarcomatoid Features:  Not identified\par Rhabdoid Features:  Not identified\par Tumor Necrosis:  Present\par Lymphovascular Invasion:  Present\par MARGINS\par Margins: Uninvolved by invasive carcinoma on this specimen (Clinically, tumor thrombus involving inferior vena cava removed at time of surgery)\par LYMPH NODES\par Regional Lymph Nodes:  No lymph nodes submitted or found\par Primary Tumor (pT):  pT3b\par Regional Lymph Nodes (pN):  pNX\par ADDITIONAL FINDINGS\par Pathologic Findings in Nonneoplastic Kidney:   None identified\par \par PD-L1 IMMUNOHISTOCHEMICAL ANALYSIS:\par Tumor Proportion Score: 2-5% / Positive\par \par Plan\par # Stage III  [id number], PD-L1  2-5% / Positive  G3 Renal cell carcinoma with IVC thrombosis, s/p Rt radical nephrectomy\par -CBC, CMP, TSH\par -Received covid vaccine x3\par -Offer adjuvant Pembrolizumab x 1 year (see trial data below),\par -Started C1 Pembro on 12/7/21. d/t trip to [place] in January, pembro was increased to 400mg Q 6 weeks with C2 on 12/28/21. Tolerating tx well w/o new symptoms. He is planning on trip to [place] in March so received C3 Pembro 200mg on 2/8/22. Will reschedule C4 Pembro 400mg(Q6 weeks) for tomorrow given pending TSH result today.  He is going to [place] on 3/2 and returning back on 4/9/22. If TSH is low, will hold C4 at this time. \par \par # Skin rash/white patches on b/l top of hands\par -Started about one month ago. Denies any itching or blister\par -Try Betamethasone ointment\par \par # Decreased TSH level 2/2 IO therapy\par -TSH trending down, TSH <0.118, T4 9.17 on 2/8/22\par -Will TSH/T4 today and hold Pembro today\par -If low, will refer him to endocrinologist, Dr. Lockhart. \par \par # Vitamin D deficiency \par -Vit D 23.7\par -Completed Vit D 61873 once weekly x 8 weeks, will repeat vit D today\par \par # Elevated Cr\par - Cr trending up after the nephrectomy. \par - Cr 1.60 today,  monitor closely, f/u with PCP \par  \par Plan discussed with Dr. Gage, [facility] in April\par \par Trial data:\par In a double-blind, placebo-controlled phase III trial (KEYNOTE-564), 994 patients with histologically confirmed clear cell renal carcinoma treated with nephrectomy were randomly assigned to either pembrolizumab 200 mg every three weeks for up to one year (17 cycles) or placebo. Tumors were classified as intermediate-high risk disease (ie, pT2, grade 4 or sarcomatoid, N0), high-risk disease (pT3, any grade, N0; or any pT, any grade, node positive), or M1 with no evidence of disease (ie, resection of all oligometastatic sites with no evidence of disease within one year of nephrectomy). Patients initiated adjuvant therapy within 12 weeks of undergoing nephrectomy.\par \par At median follow-up of 24 months, pembrolizumab improved DFS compared with placebo (two-year DFS 77 versus 68 percent, HR 0.68, 95% CI 0.53-0.87) [8]. DFS was consistent across all subgroups. Adjuvant pembrolizumab also demonstrated a statistically significant improvement in OS (two-year OS 97 versus 94 percent, HR 0.54, 95% CI 0.30-0.96), and further follow-up of OS is ongoing. Grade =3 treatment-related toxicity rates were higher with pembrolizumab than placebo (19 versus 1 percent), with no new toxicity profiles noted.

## 2022-02-28 NOTE — PHYSICAL EXAM
[Restricted in physically strenuous activity but ambulatory and able to carry out work of a light or sedentary nature] : Status 1- Restricted in physically strenuous activity but ambulatory and able to carry out work of a light or sedentary nature, e.g., light house work, office work [Normal] : affect appropriate [de-identified] : small white patches on both top of hands, w/o itching

## 2022-02-28 NOTE — HISTORY OF PRESENT ILLNESS
[de-identified] : Mr. 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 Garrett. Started C1 Pembro 200mg(q3 wks) on 12/7/21, C2 Pembro 400mg(q6 wks) on 12/28/21, C3 pembro 200mg on 2/8/22.  Today here for Pembro 400mg C4D1\par \par ONC Hx:\par Mr. Mills initially was seen by Dr. Brady on 10/18/21 for evaluation of renal mass. His PCP ordered US abdomen for health maintenance and he was found to have 10cm right renal mass.  He does not have any symptoms.  He had CT Kidney performed on 10/12/21 which showed 10.1 x 7.4 x 7.4cm heterogeneous enhancing partially exophytic mass mid to lower pole of right kidney with extensive neovascularity and extension to the renal pelvis at the lower pole,  infrahepatic IVC thrombosis.  He was then referred to Dr. Garrett on 10/20/21 who ordered CT chest and MRI abdomen.  CT Chest on 10/28/21 showed  no suspicious pulmonary finding, MRI abdomen on 10/28/21 with renal mass in right lower pole involving renal pelvis suspicious for renal cell carcinoma, 8.2 cm,  tumor thrombus within right renal vein with extension into infra hepatic IVC. He underwent right radical nephrectomy with  caval thrombus on 11/2/21, pathology showed [id number] Clear cell renal cell carcinoma. \par \par 10/12/21 CT kidney w/ and w/o at [facility]\par -10.1 x 7.4 x 7.4cm heterogeneous enhancing partially exophytic mass mid to lower pole of right kidney with extensive neovascularity and extension to the renal pelvis at the lower pole. No hydronephrosis. Filling defect within right main renal vein extending into infrahepatic IVC consistent with thrombosis. Few associated collateral vessels noted in right renal fossa region. No LAD.  \par \par 10/21/21 Urine Cytology\par NEGATIVE FOR HIGH GRADE UROTHELIAL CARCINOMA\par Squamous cells, urothelial cells, red blood cells, and degenerated cells.\par \par 10/28/21 CT Chest\par No suspicious pulmonary finding.\par Persistent thrombus within the right renal vein extending into the IVC.\par \par 10/28/21 MRI Abdomen\par 1. Renal mass in right lower pole involving renal pelvis suspicious for renal cell carcinoma, 8.2 cm.\par 2.Tumor thrombus within right renal vein with extension into infra hepatic IVC.\par \par 11/2/21\par Surgical Pathology Report\par Final Diagnosis\par Kidney, right; radical nephrectomy:\par - Clear cell renal cell carcinoma, Grade 3 - See Note and Synoptic\par  - Carcinoma thrombus involving the renal vein.\par - Carcinoma infiltrates perinephric renal fat.\par - Margins negative for carcinoma.\par \par Synoptic Summary\par KIDNEY: Nephrectomy\par SPECIMEN\par Procedure:  Radical nephrectomy\par Specimen Laterality:  Right\par TUMOR\par Histologic Type:  Clear cell renal cell carcinoma\par Histologic Grade:  G3: Nucleoli conspicuous and eosinophilic at 100x\par magnification\par Tumor Size: Greatest Dimension (Centimeters) -   7.5 cm\par Tumor Focality:  Unifocal\par Tumor Extension:  Tumor extension into perinephric tissue (beyond renal capsule), Tumor extension into major vein (renal vein or its segmental branches, inferior vena cava)\par Sarcomatoid Features:  Not identified\par Rhabdoid Features:  Not identified\par Tumor Necrosis:  Present\par Lymphovascular Invasion:  Present\par MARGINS\par Margins: Uninvolved by invasive carcinoma on this specimen (Clinically, tumor thrombus involving inferior vena cava removed at time of surgery)\par LYMPH NODES\par Regional Lymph Nodes:  No lymph nodes submitted or found\par Primary Tumor (pT):  pT3b\par Regional Lymph Nodes (pN):  pNX\par ADDITIONAL FINDINGS\par Pathologic Findings in Nonneoplastic Kidney:   None identified\par \par PD-L1 IMMUNOHISTOCHEMICAL ANALYSIS:\par Tumor Proportion Score: 2-5% / Positive\par  [de-identified] : He presents to clinic today for a f/u. Patient states he is feeling well.  Patient states occasional dizziness and rash on b/l top of hands. Denies decreased appetite, N/V/C/D, fever, chest pain, or abdominal pain.

## 2022-02-28 NOTE — DISCUSSION/SUMMARY
[FreeTextEntry1] : Patient came for blood tests. CBC and CMP were drawn. He will call us with any concerns.

## 2022-02-28 NOTE — REVIEW OF SYSTEMS
[Skin Rash] : skin rash [Dizziness] : dizziness [Negative] : Constitutional [Fever] : no fever [Chills] : no chills [Night Sweats] : no night sweats [Fatigue] : no fatigue [Recent Change In Weight] : ~T no recent weight change [de-identified] : b/l top of hands rash

## 2022-03-01 ENCOUNTER — APPOINTMENT (OUTPATIENT)
Age: 67
End: 2022-03-01

## 2022-03-01 LAB
25(OH)D3 SERPL-MCNC: 34.3 NG/ML
T3FREE SERPL-MCNC: 2.54 PG/ML
T4 FREE SERPL-MCNC: 0.87 NG/DL
TSH SERPL-ACNC: 0.25 UIU/ML

## 2022-03-02 LAB
ACTH STIM ACTH BASELINE: 41.9 PG/ML
CORTIS SERPL-MCNC: 11 UG/DL
T3 SERPL-MCNC: 89 NG/DL
T4 FREE SERPL-MCNC: 0.9 NG/DL
THYROGLOB AB SERPL-ACNC: 22.9 IU/ML
THYROPEROXIDASE AB SERPL IA-ACNC: 257 IU/ML

## 2022-04-12 ENCOUNTER — LABORATORY RESULT (OUTPATIENT)
Age: 67
End: 2022-04-12

## 2022-04-12 ENCOUNTER — OUTPATIENT (OUTPATIENT)
Dept: OUTPATIENT SERVICES | Facility: HOSPITAL | Age: 67
LOS: 1 days | End: 2022-04-12
Payer: MEDICARE

## 2022-04-12 ENCOUNTER — APPOINTMENT (OUTPATIENT)
Dept: HEMATOLOGY ONCOLOGY | Facility: CLINIC | Age: 67
End: 2022-04-12
Payer: MEDICAID

## 2022-04-12 ENCOUNTER — APPOINTMENT (OUTPATIENT)
Age: 67
End: 2022-04-12

## 2022-04-12 VITALS
TEMPERATURE: 97.3 F | DIASTOLIC BLOOD PRESSURE: 90 MMHG | OXYGEN SATURATION: 96 % | WEIGHT: 152 LBS | RESPIRATION RATE: 18 BRPM | HEART RATE: 74 BPM | BODY MASS INDEX: 23.04 KG/M2 | HEIGHT: 68 IN | SYSTOLIC BLOOD PRESSURE: 139 MMHG

## 2022-04-12 VITALS
RESPIRATION RATE: 18 BRPM | SYSTOLIC BLOOD PRESSURE: 132 MMHG | DIASTOLIC BLOOD PRESSURE: 87 MMHG | OXYGEN SATURATION: 96 % | HEART RATE: 66 BPM | TEMPERATURE: 96 F

## 2022-04-12 DIAGNOSIS — Z98.49 CATARACT EXTRACTION STATUS, UNSPECIFIED EYE: Chronic | ICD-10-CM

## 2022-04-12 DIAGNOSIS — R79.89 OTHER SPECIFIED ABNORMAL FINDINGS OF BLOOD CHEMISTRY: ICD-10-CM

## 2022-04-12 DIAGNOSIS — C64.2 MALIGNANT NEOPLASM OF LEFT KIDNEY, EXCEPT RENAL PELVIS: ICD-10-CM

## 2022-04-12 DIAGNOSIS — Z90.49 ACQUIRED ABSENCE OF OTHER SPECIFIED PARTS OF DIGESTIVE TRACT: Chronic | ICD-10-CM

## 2022-04-12 DIAGNOSIS — Z98.890 OTHER SPECIFIED POSTPROCEDURAL STATES: Chronic | ICD-10-CM

## 2022-04-12 LAB
ALBUMIN SERPL ELPH-MCNC: 3.7 G/DL
ALP BLD-CCNC: 84 U/L
ALT SERPL-CCNC: 16 U/L
ANION GAP SERPL CALC-SCNC: 7 MMOL/L
AST SERPL-CCNC: 30 U/L
BILIRUB SERPL-MCNC: 0.7 MG/DL
BUN SERPL-MCNC: 16 MG/DL
CALCIUM SERPL-MCNC: 9.2 MG/DL
CHLORIDE SERPL-SCNC: 107 MMOL/L
CO2 SERPL-SCNC: 27 MMOL/L
CREAT SERPL-MCNC: 1.9 MG/DL
EGFR: 38 ML/MIN/1.73M2
GLUCOSE SERPL-MCNC: 111 MG/DL
POTASSIUM SERPL-SCNC: 4 MMOL/L
PROT SERPL-MCNC: 7 G/DL
SODIUM SERPL-SCNC: 141 MMOL/L
TSH RECEPTOR AB: <1.1 IU/L

## 2022-04-12 PROCEDURE — 99214 OFFICE O/P EST MOD 30 MIN: CPT

## 2022-04-12 PROCEDURE — 96413 CHEMO IV INFUSION 1 HR: CPT

## 2022-04-12 RX ORDER — PEMBROLIZUMAB 25 MG/ML
200 INJECTION, SOLUTION INTRAVENOUS ONCE
Refills: 0 | Status: COMPLETED | OUTPATIENT
Start: 2022-04-12 | End: 2022-04-12

## 2022-04-12 RX ADMIN — PEMBROLIZUMAB 216 MILLIGRAM(S): 25 INJECTION, SOLUTION INTRAVENOUS at 15:00

## 2022-04-12 RX ADMIN — PEMBROLIZUMAB 200 MILLIGRAM(S): 25 INJECTION, SOLUTION INTRAVENOUS at 15:30

## 2022-04-12 NOTE — REVIEW OF SYSTEMS
[Skin Rash] : skin rash [Negative] : Allergic/Immunologic [Fever] : no fever [Chills] : no chills [Night Sweats] : no night sweats [Fatigue] : no fatigue [Recent Change In Weight] : ~T no recent weight change [Dizziness] : no dizziness [de-identified] : b/l top of hands rash  improved

## 2022-04-12 NOTE — ASSESSMENT
[FreeTextEntry1] : Mr. 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 Garrett. Started C1 Pembro 200mg(q3 wks) on 12/7/21, C2 Pembro 400mg(q6 wks) on 12/28/21, C3 pembro 200mg on 2/8/22. C4 Pembro was held d/t  decreased TSH level. He visited  to [facility] in March and came back to [place] now. Today here for f/u.  \par \par 10/12/21 CT kidney w/ and w/o at [facility]\par -10.1 x 7.4 x 7.4cm heterogeneous enhancing partially exophytic mass mid to lower pole of right kidney with extensive neovascularity and extension to the renal pelvis at the lower pole. No hydronephrosis. Filling defect within right main renal vein extending into infrahepatic IVC consistent with thrombosis. Few associated collateral vessels noted in right renal fossa region. No LAD.  \par \par 10/21/21 Urine Cytology\par NEGATIVE FOR HIGH GRADE UROTHELIAL CARCINOMA\par Squamous cells, urothelial cells, red blood cells, and degenerated cells.\par \par 10/28/21 CT Chest\par No suspicious pulmonary finding.\par Persistent thrombus within the right renal vein extending into the IVC.\par \par 10/28/21 MRI Abdomen\par 1. Renal mass in right lower pole involving renal pelvis suspicious for renal cell carcinoma, 8.2 cm.\par 2.Tumor thrombus within right renal vein with extension into infra hepatic IVC.\par \par 11/2/21\par Surgical Pathology Report\par Final Diagnosis\par Kidney, right; radical nephrectomy:\par - Clear cell renal cell carcinoma, Grade 3 - See Note and Synoptic\par  - Carcinoma thrombus involving the renal vein.\par - Carcinoma infiltrates perinephric renal fat.\par - Margins negative for carcinoma.\par \par Synoptic Summary\par KIDNEY: Nephrectomy\par SPECIMEN\par Procedure:  Radical nephrectomy\par Specimen Laterality:  Right\par TUMOR\par Histologic Type:  Clear cell renal cell carcinoma\par Histologic Grade:  G3: Nucleoli conspicuous and eosinophilic at 100x\par magnification\par Tumor Size: Greatest Dimension (Centimeters) -   7.5 cm\par Tumor Focality:  Unifocal\par Tumor Extension:  Tumor extension into perinephric tissue (beyond renal capsule), Tumor extension into major vein (renal vein or its segmental branches, inferior vena cava)\par Sarcomatoid Features:  Not identified\par Rhabdoid Features:  Not identified\par Tumor Necrosis:  Present\par Lymphovascular Invasion:  Present\par MARGINS\par Margins: Uninvolved by invasive carcinoma on this specimen (Clinically, tumor thrombus involving inferior vena cava removed at time of surgery)\par LYMPH NODES\par Regional Lymph Nodes:  No lymph nodes submitted or found\par Primary Tumor (pT):  pT3b\par Regional Lymph Nodes (pN):  pNX\par ADDITIONAL FINDINGS\par Pathologic Findings in Nonneoplastic Kidney:   None identified\par \par PD-L1 IMMUNOHISTOCHEMICAL ANALYSIS:\par Tumor Proportion Score: 2-5% / Positive\par \par Plan\par # Stage III  [id number], PD-L1  2-5% / Positive  G3 Renal cell carcinoma with IVC thrombosis, s/p Rt radical nephrectomy\par -CBC, CMP, TSH\par -Received covid vaccine x3\par -Offer adjuvant Pembrolizumab x 1 year (see trial data below),\par -Started C1 Pembro on 12/7/21. d/t trip to [place] in January, pembro was increased to 400mg Q 6 weeks with C2 on 12/28/21. Tolerating tx well w/o new symptoms.  Received C3 Pembro 200mg on 2/8/22. C4 Pembro was held d/t  decreased TSH level. Further work up delayed d/t trip to [place] in March.  Asymptomatic, will proceed with C4 today\par \par # Skin rash/white patches on b/l top of hands improved\par -Try Betamethasone ointment\par \par # Decreased TSH level 2/2 IO therapy, possibly Hashimoto's\par -TSH trending down, TSH <0.118, T4 9.17 on 2/8/22\par -Will TSH/T4 today \par -If low, will refer him to endocrinologist, Dr. Lockhart. \par \par # Vitamin D deficiency \par -Vit D 34.3 on 2/28/22\par -Completed Vit D 49842 once weekly x 8 weeks\par \par # Elevated Cr\par - Cr trending up after the nephrectomy. \par - Cr 1.60 today,  monitor closely, f/u with PCP \par \par RT in 3 weeks wit pembro\par \par Trial data:\par In a double-blind, placebo-controlled phase III trial ([id number]), [id number] patients with histologically confirmed clear cell renal carcinoma treated with nephrectomy were randomly assigned to either pembrolizumab 200 mg every three weeks for up to one year (17 cycles) or placebo. Tumors were classified as intermediate-high risk disease (ie, pT2, grade 4 or sarcomatoid, N0), high-risk disease (pT3, any grade, N0; or any pT, any grade, node positive), or M1 with no evidence of disease (ie, resection of all oligometastatic sites with no evidence of disease within one year of nephrectomy). Patients initiated adjuvant therapy within 12 weeks of undergoing nephrectomy.\par \par At median follow-up of 24 months, pembrolizumab improved DFS compared with placebo (two-year DFS 77 versus 68 percent, HR 0.68, 95% CI 0.53-0.87) [8]. DFS was consistent across all subgroups. Adjuvant pembrolizumab also demonstrated a statistically significant improvement in OS (two-year OS 97 versus 94 percent, HR 0.54, 95% CI 0.30-0.96), and further follow-up of OS is ongoing. Grade =3 treatment-related toxicity rates were higher with pembrolizumab than placebo (19 versus 1 percent), with no new toxicity profiles noted.

## 2022-04-12 NOTE — PHYSICAL EXAM
[Restricted in physically strenuous activity but ambulatory and able to carry out work of a light or sedentary nature] : Status 1- Restricted in physically strenuous activity but ambulatory and able to carry out work of a light or sedentary nature, e.g., light house work, office work [Normal] : affect appropriate [de-identified] : small white patches on both top of hands, improved

## 2022-04-12 NOTE — HISTORY OF PRESENT ILLNESS
[de-identified] : Mr. 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 Garrett. Started C1 Pembro 200mg(q3 wks) on 12/7/21, C2 Pembro 400mg(q6 wks) on 12/28/21, C3 pembro 200mg on 2/8/22.  C4 Pembro was held d/t  decreased TSH level. He visited to [facility] in March and came back to [place] now. Today here for f/u.  \par \par ONC Hx:\par Mr. Mills initially was seen by Dr. Brady on 10/18/21 for evaluation of renal mass. His PCP ordered US abdomen for health maintenance and he was found to have 10cm right renal mass.  He does not have any symptoms.  He had CT Kidney performed on 10/12/21 which showed 10.1 x 7.4 x 7.4cm heterogeneous enhancing partially exophytic mass mid to lower pole of right kidney with extensive neovascularity and extension to the renal pelvis at the lower pole,  infrahepatic IVC thrombosis.  He was then referred to Dr. Garrett on 10/20/21 who ordered CT chest and MRI abdomen.  CT Chest on 10/28/21 showed  no suspicious pulmonary finding, MRI abdomen on 10/28/21 with renal mass in right lower pole involving renal pelvis suspicious for renal cell carcinoma, 8.2 cm,  tumor thrombus within right renal vein with extension into infra hepatic IVC. He underwent right radical nephrectomy with  caval thrombus on 11/2/21, pathology showed [id number] Clear cell renal cell carcinoma. \par \par 10/12/21 CT kidney w/ and w/o at [facility]\par -10.1 x 7.4 x 7.4cm heterogeneous enhancing partially exophytic mass mid to lower pole of right kidney with extensive neovascularity and extension to the renal pelvis at the lower pole. No hydronephrosis. Filling defect within right main renal vein extending into infrahepatic IVC consistent with thrombosis. Few associated collateral vessels noted in right renal fossa region. No LAD.  \par \par 10/21/21 Urine Cytology\par NEGATIVE FOR HIGH GRADE UROTHELIAL CARCINOMA\par Squamous cells, urothelial cells, red blood cells, and degenerated cells.\par \par 10/28/21 CT Chest\par No suspicious pulmonary finding.\par Persistent thrombus within the right renal vein extending into the IVC.\par \par 10/28/21 MRI Abdomen\par 1. Renal mass in right lower pole involving renal pelvis suspicious for renal cell carcinoma, 8.2 cm.\par 2.Tumor thrombus within right renal vein with extension into infra hepatic IVC.\par \par 11/2/21\par Surgical Pathology Report\par Final Diagnosis\par Kidney, right; radical nephrectomy:\par - Clear cell renal cell carcinoma, Grade 3 - See Note and Synoptic\par  - Carcinoma thrombus involving the renal vein.\par - Carcinoma infiltrates perinephric renal fat.\par - Margins negative for carcinoma.\par \par Synoptic Summary\par KIDNEY: Nephrectomy\par SPECIMEN\par Procedure:  Radical nephrectomy\par Specimen Laterality:  Right\par TUMOR\par Histologic Type:  Clear cell renal cell carcinoma\par Histologic Grade:  G3: Nucleoli conspicuous and eosinophilic at 100x\par magnification\par Tumor Size: Greatest Dimension (Centimeters) -   7.5 cm\par Tumor Focality:  Unifocal\par Tumor Extension:  Tumor extension into perinephric tissue (beyond renal capsule), Tumor extension into major vein (renal vein or its segmental branches, inferior vena cava)\par Sarcomatoid Features:  Not identified\par Rhabdoid Features:  Not identified\par Tumor Necrosis:  Present\par Lymphovascular Invasion:  Present\par MARGINS\par Margins: Uninvolved by invasive carcinoma on this specimen (Clinically, tumor thrombus involving inferior vena cava removed at time of surgery)\par LYMPH NODES\par Regional Lymph Nodes:  No lymph nodes submitted or found\par Primary Tumor (pT):  pT3b\par Regional Lymph Nodes (pN):  pNX\par ADDITIONAL FINDINGS\par Pathologic Findings in Nonneoplastic Kidney:   None identified\par \par PD-L1 IMMUNOHISTOCHEMICAL ANALYSIS:\par Tumor Proportion Score: 2-5% / Positive\par  [de-identified] : He presents to clinic today for a f/u. He just came back from [place]. Feels good. Reports intermittent headache, resolved with HBP med. Denies decreased appetite, N/V/C/D, fever, chest pain, or abdominal pain.

## 2022-04-15 LAB
T3 SERPL-MCNC: 91 NG/DL
T4 FREE SERPL-MCNC: 0.54 NG/DL
TSH SERPL-ACNC: 49.31 UIU/ML

## 2022-04-29 ENCOUNTER — APPOINTMENT (OUTPATIENT)
Dept: ENDOCRINOLOGY | Facility: CLINIC | Age: 67
End: 2022-04-29
Payer: MEDICARE

## 2022-04-29 VITALS
OXYGEN SATURATION: 96 % | WEIGHT: 154 LBS | BODY MASS INDEX: 23.34 KG/M2 | TEMPERATURE: 97.8 F | HEART RATE: 83 BPM | SYSTOLIC BLOOD PRESSURE: 147 MMHG | HEIGHT: 68 IN | DIASTOLIC BLOOD PRESSURE: 84 MMHG

## 2022-04-29 PROCEDURE — 99203 OFFICE O/P NEW LOW 30 MIN: CPT

## 2022-04-29 RX ORDER — LOSARTAN POTASSIUM 100 MG/1
TABLET, FILM COATED ORAL
Refills: 0 | Status: DISCONTINUED | COMMUNITY
End: 2022-04-29

## 2022-04-29 NOTE — CONSULT LETTER
[Dear  ___] : Dear  [unfilled], [Consult Letter:] : I had the pleasure of evaluating your patient, [unfilled]. [Please see my note below.] : Please see my note below. [Consult Closing:] : Thank you very much for allowing me to participate in the care of this patient.  If you have any questions, please do not hesitate to contact me. [Sincerely,] : Sincerely, [FreeTextEntry3] : Cristal Lockhart MD

## 2022-04-29 NOTE — HISTORY OF PRESENT ILLNESS
[FreeTextEntry1] : Patient is a 67 yo woman, former smoker, HTN with recently diagnosed kidney cancer.  He is here today for hypothyroidism\par \par Patient with renal mass in 2021. He had right radical nephrectomy.  He is on RCT with pembrolizumab.  Reports some cold intolerance two weeks ago. No change in energy levels, intermittent constipation.  TSH April was 49 and started on levothyroxine 88 mcg.  The cold intolerance has improved\par No family hx of thyroid disease\par Appetite is good right now.  After the surgery, had loss of appetite but it's now improved.  \par No exposures to lithium/amiodarone/radiation\par Quite smoking 1 year ago\par Occasional alcohol\par Was taking BP medicines but was told to stop when BP was low. Now it remain high normal\par States diet is healthy: rice mixed with beans, tries to avoid noodles; has reduced red meat and will have more pork and fish\par

## 2022-04-29 NOTE — ASSESSMENT
[Levothyroxine] : The patient was instructed to take Levothyroxine on an empty stomach, separate from vitamins, and wait at least 30 minutes before eating [FreeTextEntry1] : Patient is a 67 yo man with renal cancer on pembrolizumab now with hypothyroidism\par \par 1. Hypothyroid\par -thyroid dysfunction is common in patients treated with pembrolizumab. it can cause destructive thyroiditis and overt hypothyroidism\par -he developed chemical hypothyroidism when TSH level was checked April 12, 2022. TSH was 49 with a free T4 of 0.541.  He was started on Levothyroxine 88 mcg po daily April 2022\par -patient did experiences symptoms including cold intolerance and fatigue which he believes has improved a little bit\par -adheres to LT4 88 mcg po daily\par -repeat TFT's today, adjust Lt4 based on Free T4 levels\par -explained that this is side effect of medicine\par -encourage healthy diet\par \par Follow up in 3 months

## 2022-04-29 NOTE — PHYSICAL EXAM
[Alert] : alert [Well Nourished] : well nourished [No Acute Distress] : no acute distress [EOMI] : extra ocular movement intact [Thyroid Not Enlarged] : the thyroid was not enlarged [No Respiratory Distress] : no respiratory distress [No Accessory Muscle Use] : no accessory muscle use [Clear to Auscultation] : lungs were clear to auscultation bilaterally [Normal S1, S2] : normal S1 and S2 [Normal Rate] : heart rate was normal [Normal Bowel Sounds] : normal bowel sounds [Not Tender] : non-tender [Soft] : abdomen soft [Normal Gait] : normal gait [No Motor Deficits] : the motor exam was normal [Normal Affect] : the affect was normal [Normal Insight/Judgement] : insight and judgment were intact [Normal Mood] : the mood was normal

## 2022-04-29 NOTE — REVIEW OF SYSTEMS
[Fatigue] : fatigue [Decreased Appetite] : appetite not decreased [Dysphagia] : no dysphagia [Dysphonia] : no dysphonia [Chest Pain] : no chest pain [Palpitations] : no palpitations [Shortness Of Breath] : no shortness of breath [Nausea] : no nausea [Constipation] : no constipation [Vomiting] : no vomiting [Diarrhea] : no diarrhea [Dry Skin] : dry skin [Cold Intolerance] : cold intolerance

## 2022-05-02 LAB
ESTIMATED AVERAGE GLUCOSE: 120 MG/DL
HBA1C MFR BLD HPLC: 5.8 %
T3 SERPL-MCNC: 101 NG/DL
T4 FREE SERPL-MCNC: 1.2 NG/DL
THYROGLOB AB SERPL-ACNC: 20.4 IU/ML
THYROPEROXIDASE AB SERPL IA-ACNC: 268 IU/ML
TSH SERPL-ACNC: 8.93 UIU/ML

## 2022-05-03 ENCOUNTER — LABORATORY RESULT (OUTPATIENT)
Age: 67
End: 2022-05-03

## 2022-05-03 ENCOUNTER — APPOINTMENT (OUTPATIENT)
Dept: INFUSION THERAPY | Facility: CLINIC | Age: 67
End: 2022-05-03

## 2022-05-03 ENCOUNTER — OUTPATIENT (OUTPATIENT)
Dept: OUTPATIENT SERVICES | Facility: HOSPITAL | Age: 67
LOS: 1 days | End: 2022-05-03
Payer: MEDICARE

## 2022-05-03 ENCOUNTER — APPOINTMENT (OUTPATIENT)
Dept: HEMATOLOGY ONCOLOGY | Facility: CLINIC | Age: 67
End: 2022-05-03
Payer: MEDICAID

## 2022-05-03 VITALS
HEART RATE: 86 BPM | HEIGHT: 68 IN | SYSTOLIC BLOOD PRESSURE: 143 MMHG | WEIGHT: 154 LBS | TEMPERATURE: 96.6 F | OXYGEN SATURATION: 96 % | DIASTOLIC BLOOD PRESSURE: 88 MMHG | BODY MASS INDEX: 23.34 KG/M2 | RESPIRATION RATE: 18 BRPM

## 2022-05-03 VITALS
HEIGHT: 64 IN | OXYGEN SATURATION: 96 % | SYSTOLIC BLOOD PRESSURE: 143 MMHG | HEART RATE: 86 BPM | RESPIRATION RATE: 17 BRPM | TEMPERATURE: 97 F | DIASTOLIC BLOOD PRESSURE: 88 MMHG | WEIGHT: 154.1 LBS

## 2022-05-03 DIAGNOSIS — C64.2 MALIGNANT NEOPLASM OF LEFT KIDNEY, EXCEPT RENAL PELVIS: ICD-10-CM

## 2022-05-03 DIAGNOSIS — Z90.49 ACQUIRED ABSENCE OF OTHER SPECIFIED PARTS OF DIGESTIVE TRACT: Chronic | ICD-10-CM

## 2022-05-03 DIAGNOSIS — Z98.890 OTHER SPECIFIED POSTPROCEDURAL STATES: Chronic | ICD-10-CM

## 2022-05-03 DIAGNOSIS — Z98.49 CATARACT EXTRACTION STATUS, UNSPECIFIED EYE: Chronic | ICD-10-CM

## 2022-05-03 PROCEDURE — 96413 CHEMO IV INFUSION 1 HR: CPT

## 2022-05-03 PROCEDURE — 99214 OFFICE O/P EST MOD 30 MIN: CPT

## 2022-05-03 RX ORDER — PEMBROLIZUMAB 25 MG/ML
200 INJECTION, SOLUTION INTRAVENOUS ONCE
Refills: 0 | Status: COMPLETED | OUTPATIENT
Start: 2022-05-03 | End: 2022-05-03

## 2022-05-03 RX ADMIN — PEMBROLIZUMAB 216 MILLIGRAM(S): 25 INJECTION, SOLUTION INTRAVENOUS at 14:58

## 2022-05-03 RX ADMIN — PEMBROLIZUMAB 200 MILLIGRAM(S): 25 INJECTION, SOLUTION INTRAVENOUS at 15:28

## 2022-05-03 NOTE — HISTORY OF PRESENT ILLNESS
[de-identified] : Mr. 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 Garrett. Started C1 Pembro 200mg(q3 wks) on 12/7/21, C2 Pembro 400mg(q6 wks) on 12/28/21, C3 pembro 200mg on 2/8/22.  C4 Pembro was held d/t  decreased TSH level. He visited to [facility] in March and came back to [place] now. Resumed C4 pembrolizumab 200mg on 4/12/22. Developed hypothyroidism  now on levothyroxine 88mcg with Dr. Lockhart,  Today here for f/u with C5 pembrolizumab. \par \par ONC Hx:\par Mr. Mills initially was seen by Dr. Brady on 10/18/21 for evaluation of renal mass. His PCP ordered US abdomen for health maintenance and he was found to have 10cm right renal mass.  He does not have any symptoms.  He had CT Kidney performed on 10/12/21 which showed 10.1 x 7.4 x 7.4cm heterogeneous enhancing partially exophytic mass mid to lower pole of right kidney with extensive neovascularity and extension to the renal pelvis at the lower pole,  infrahepatic IVC thrombosis.  He was then referred to Dr. Garrett on 10/20/21 who ordered CT chest and MRI abdomen.  CT Chest on 10/28/21 showed  no suspicious pulmonary finding, MRI abdomen on 10/28/21 with renal mass in right lower pole involving renal pelvis suspicious for renal cell carcinoma, 8.2 cm,  tumor thrombus within right renal vein with extension into infra hepatic IVC. He underwent right radical nephrectomy with  caval thrombus on 11/2/21, pathology showed [id number] Clear cell renal cell carcinoma. \par \par 10/12/21 CT kidney w/ and w/o at [facility]\par -10.1 x 7.4 x 7.4cm heterogeneous enhancing partially exophytic mass mid to lower pole of right kidney with extensive neovascularity and extension to the renal pelvis at the lower pole. No hydronephrosis. Filling defect within right main renal vein extending into infrahepatic IVC consistent with thrombosis. Few associated collateral vessels noted in right renal fossa region. No LAD.  \par \par 10/21/21 Urine Cytology\par NEGATIVE FOR HIGH GRADE UROTHELIAL CARCINOMA\par Squamous cells, urothelial cells, red blood cells, and degenerated cells.\par \par 10/28/21 CT Chest\par No suspicious pulmonary finding.\par Persistent thrombus within the right renal vein extending into the IVC.\par \par 10/28/21 MRI Abdomen\par 1. Renal mass in right lower pole involving renal pelvis suspicious for renal cell carcinoma, 8.2 cm.\par 2.Tumor thrombus within right renal vein with extension into infra hepatic IVC.\par \par 11/2/21\par Surgical Pathology Report\par Final Diagnosis\par Kidney, right; radical nephrectomy:\par - Clear cell renal cell carcinoma, Grade 3 - See Note and Synoptic\par  - Carcinoma thrombus involving the renal vein.\par - Carcinoma infiltrates perinephric renal fat.\par - Margins negative for carcinoma.\par \par Synoptic Summary\par KIDNEY: Nephrectomy\par SPECIMEN\par Procedure:  Radical nephrectomy\par Specimen Laterality:  Right\par TUMOR\par Histologic Type:  Clear cell renal cell carcinoma\par Histologic Grade:  G3: Nucleoli conspicuous and eosinophilic at 100x\par magnification\par Tumor Size: Greatest Dimension (Centimeters) -   7.5 cm\par Tumor Focality:  Unifocal\par Tumor Extension:  Tumor extension into perinephric tissue (beyond renal capsule), Tumor extension into major vein (renal vein or its segmental branches, inferior vena cava)\par Sarcomatoid Features:  Not identified\par Rhabdoid Features:  Not identified\par Tumor Necrosis:  Present\par Lymphovascular Invasion:  Present\par MARGINS\par Margins: Uninvolved by invasive carcinoma on this specimen (Clinically, tumor thrombus involving inferior vena cava removed at time of surgery)\par LYMPH NODES\par Regional Lymph Nodes:  No lymph nodes submitted or found\par Primary Tumor (pT):  pT3b\par Regional Lymph Nodes (pN):  pNX\par ADDITIONAL FINDINGS\par Pathologic Findings in Nonneoplastic Kidney:   None identified\par \par PD-L1 IMMUNOHISTOCHEMICAL ANALYSIS:\par Tumor Proportion Score: 2-5% / Positive\par  [de-identified] : He presents to clinic today for a f/u. Patient states he feels fatigue. Denies rash, itchiness, chills, fevers, decreased appetite, N/V/C/D, chest pain, or abdominal pain.

## 2022-05-03 NOTE — ASSESSMENT
[FreeTextEntry1] : Mr. 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 Garrett. Started C1 Pembro 200mg(q3 wks) on 12/7/21, C2 Pembro 400mg(q6 wks) on 12/28/21, C3 pembro 200mg on 2/8/22. C4 Pembro was held d/t  decreased TSH level. He visited  to [facility] in March and came back to [place] now. Resumed C4 pembrolizumab 200mg on 4/12/22. Developed hypothyroidism  now on levothyroxine 88mcg with Dr. Lockhart,  Today here for f/u with C5 pembrolizumab. \par \par 10/12/21 CT kidney w/ and w/o at [facility]\par -10.1 x 7.4 x 7.4cm heterogeneous enhancing partially exophytic mass mid to lower pole of right kidney with extensive neovascularity and extension to the renal pelvis at the lower pole. No hydronephrosis. Filling defect within right main renal vein extending into infrahepatic IVC consistent with thrombosis. Few associated collateral vessels noted in right renal fossa region. No LAD.  \par \par 10/21/21 Urine Cytology\par NEGATIVE FOR HIGH GRADE UROTHELIAL CARCINOMA\par Squamous cells, urothelial cells, red blood cells, and degenerated cells.\par \par 10/28/21 CT Chest\par No suspicious pulmonary finding.\par Persistent thrombus within the right renal vein extending into the IVC.\par \par 10/28/21 MRI Abdomen\par 1. Renal mass in right lower pole involving renal pelvis suspicious for renal cell carcinoma, 8.2 cm.\par 2.Tumor thrombus within right renal vein with extension into infra hepatic IVC.\par \par 11/2/21\par Surgical Pathology Report\par Final Diagnosis\par Kidney, right; radical nephrectomy:\par - Clear cell renal cell carcinoma, Grade 3 - See Note and Synoptic\par  - Carcinoma thrombus involving the renal vein.\par - Carcinoma infiltrates perinephric renal fat.\par - Margins negative for carcinoma.\par \par Synoptic Summary\par KIDNEY: Nephrectomy\par SPECIMEN\par Procedure:  Radical nephrectomy\par Specimen Laterality:  Right\par TUMOR\par Histologic Type:  Clear cell renal cell carcinoma\par Histologic Grade:  G3: Nucleoli conspicuous and eosinophilic at 100x\par magnification\par Tumor Size: Greatest Dimension (Centimeters) -   7.5 cm\par Tumor Focality:  Unifocal\par Tumor Extension:  Tumor extension into perinephric tissue (beyond renal capsule), Tumor extension into major vein (renal vein or its segmental branches, inferior vena cava)\par Sarcomatoid Features:  Not identified\par Rhabdoid Features:  Not identified\par Tumor Necrosis:  Present\par Lymphovascular Invasion:  Present\par MARGINS\par Margins: Uninvolved by invasive carcinoma on this specimen (Clinically, tumor thrombus involving inferior vena cava removed at time of surgery)\par LYMPH NODES\par Regional Lymph Nodes:  No lymph nodes submitted or found\par Primary Tumor (pT):  pT3b\par Regional Lymph Nodes (pN):  pNX\par ADDITIONAL FINDINGS\par Pathologic Findings in Nonneoplastic Kidney:   None identified\par \par PD-L1 IMMUNOHISTOCHEMICAL ANALYSIS:\par Tumor Proportion Score: 2-5% / Positive\par \par Plan\par # Stage III  [id number], PD-L1  2-5% / Positive  G3 Renal cell carcinoma with IVC thrombosis, s/p Rt radical nephrectomy\par -CBC, CMP, TSH, Free T4\par -Received covid vaccine x3\par -Offer adjuvant Pembrolizumab x 1 year (see trial data below),\par -Started C1 Pembro on 12/7/21. d/t trip to [place] in January, pembro was increased to 400mg Q 6 weeks with C2 on 12/28/21. Tolerating tx well w/o new symptoms.  Received C3 Pembro 200mg on 2/8/22. C4 Pembro was held d/t  decreased TSH level. Further work up delayed d/t trip to [place] in March.Resumed C4 pembrolizumab 200mg on 4/12/22. Developed hypothyroidism  now on levothyroxine 88mcg with Dr. Lockhart, Will proceed with  C5 pembrolizumab today. Will scan end of June.\par \par #Hypothyroidism/Hashimoto's  2/2 IO therapy\par - TSH 49.310 on 4/12/22, 8.93 on 4/29/22\par -ct levothyroxine 88mcg and f/u with Dr. Lockhart\par \par # Vitamin D deficiency \par -Vit D 34.3 on 2/28/22\par -Completed Vit D 88144 once weekly x 8 weeks\par \par # Elevated Cr\par - Cr trending up after the nephrectomy. \par - Cr 1.60 today,  monitor closely, f/u with PCP \par \par #Itching\par -due to IO\par -ct moisturizer\par \par RT in 3 weeks wit pembro\par \par Trial data:\par In a double-blind, placebo-controlled phase III trial (KEYNOTE-564), 994 patients with histologically confirmed clear cell renal carcinoma treated with nephrectomy were randomly assigned to either pembrolizumab 200 mg every three weeks for up to one year (17 cycles) or placebo. Tumors were classified as intermediate-high risk disease (ie, pT2, grade 4 or sarcomatoid, N0), high-risk disease (pT3, any grade, N0; or any pT, any grade, node positive), or M1 with no evidence of disease (ie, resection of all oligometastatic sites with no evidence of disease within one year of nephrectomy). Patients initiated adjuvant therapy within 12 weeks of undergoing nephrectomy.\par \par At median follow-up of 24 months, pembrolizumab improved DFS compared with placebo (two-year DFS 77 versus 68 percent, HR 0.68, 95% CI 0.53-0.87) [8]. DFS was consistent across all subgroups. Adjuvant pembrolizumab also demonstrated a statistically significant improvement in OS (two-year OS 97 versus 94 percent, HR 0.54, 95% CI 0.30-0.96), and further follow-up of OS is ongoing. Grade =3 treatment-related toxicity rates were higher with pembrolizumab than placebo (19 versus 1 percent), with no new toxicity profiles noted.

## 2022-05-03 NOTE — DISCUSSION/SUMMARY
[FreeTextEntry1] : Spoke with patient regarding abnormal thyroid tests- TSH 49.310, Free T4 0.541.  He has hypothyroidism now based on his lab today.  Instructed him to start taking Levothyroxine 88 mcg once daily before meal as consulted by Cristal Chin. Rx was sent to his pharmacy.  He is scheduled to see Dr. Lockhart for further management on 4/29/22 and will call us with any concerns.

## 2022-05-03 NOTE — REVIEW OF SYSTEMS
[Negative] : Allergic/Immunologic [Fatigue] : fatigue [Fever] : no fever [Chills] : no chills [Night Sweats] : no night sweats [Recent Change In Weight] : ~T no recent weight change [Skin Rash] : no skin rash [Dizziness] : no dizziness

## 2022-05-03 NOTE — PHYSICAL EXAM
[Restricted in physically strenuous activity but ambulatory and able to carry out work of a light or sedentary nature] : Status 1- Restricted in physically strenuous activity but ambulatory and able to carry out work of a light or sedentary nature, e.g., light house work, office work [Normal] : affect appropriate [de-identified] : small white patches on both top of hands, improved

## 2022-05-09 LAB
ALBUMIN SERPL ELPH-MCNC: 3.9 G/DL
ALP BLD-CCNC: 102 U/L
ALT SERPL-CCNC: 16 U/L
ANION GAP SERPL CALC-SCNC: 10 MMOL/L
AST SERPL-CCNC: 22 U/L
BASOPHILS # BLD AUTO: 0.08 K/UL
BASOPHILS NFR BLD AUTO: 0.9 %
BILIRUB SERPL-MCNC: 0.3 MG/DL
BUN SERPL-MCNC: 18 MG/DL
CALCIUM SERPL-MCNC: 8.8 MG/DL
CHLORIDE SERPL-SCNC: 106 MMOL/L
CO2 SERPL-SCNC: 23 MMOL/L
CREAT SERPL-MCNC: 1.7 MG/DL
EGFR: 44 ML/MIN/1.73M2
EOSINOPHIL # BLD AUTO: 0.12 K/UL
EOSINOPHIL NFR BLD AUTO: 1.4 %
GLUCOSE SERPL-MCNC: 104 MG/DL
HCT VFR BLD CALC: 40.8 %
HGB BLD-MCNC: 13.4 G/DL
IMM GRANULOCYTES NFR BLD AUTO: 0.1 %
LYMPHOCYTES # BLD AUTO: 2.25 K/UL
LYMPHOCYTES NFR BLD AUTO: 26.2 %
MAN DIFF?: NORMAL
MCHC RBC-ENTMCNC: 28.3 PG
MCHC RBC-ENTMCNC: 32.8 GM/DL
MCV RBC AUTO: 86.1 FL
MONOCYTES # BLD AUTO: 0.52 K/UL
MONOCYTES NFR BLD AUTO: 6 %
NEUTROPHILS # BLD AUTO: 5.62 K/UL
NEUTROPHILS NFR BLD AUTO: 65.4 %
PLATELET # BLD AUTO: 236 K/UL
POTASSIUM SERPL-SCNC: 4 MMOL/L
PROT SERPL-MCNC: 7 G/DL
RBC # BLD: 4.74 M/UL
RBC # FLD: 14.2 %
SODIUM SERPL-SCNC: 139 MMOL/L
TSH SERPL-ACNC: 6.55 UIU/ML
WBC # FLD AUTO: 8.6 K/UL

## 2022-05-16 ENCOUNTER — APPOINTMENT (OUTPATIENT)
Dept: UROLOGY | Facility: CLINIC | Age: 67
End: 2022-05-16
Payer: MEDICAID

## 2022-05-16 VITALS — HEART RATE: 91 BPM | SYSTOLIC BLOOD PRESSURE: 135 MMHG | TEMPERATURE: 98.2 F | DIASTOLIC BLOOD PRESSURE: 77 MMHG

## 2022-05-16 PROCEDURE — 99213 OFFICE O/P EST LOW 20 MIN: CPT

## 2022-05-16 NOTE — ASSESSMENT
[FreeTextEntry1] : 65 yo male with 10cm right renal mass and IVC thrombus. \par Metastatic workup negative\par s/p radical nephrectomy 11/2/21\par Currently underdoing adjuvant pembro\par Reviewed labs\par Doing well \par Imaging in June with Med Onc. f/u 3 months

## 2022-05-16 NOTE — HISTORY OF PRESENT ILLNESS
[FreeTextEntry1] : 67 yo Telugu male with PMHx HTN presents for 10cm right renal mass. First discovered on abdominal US which he reports was ordered by his primary for health maintenance-he has no complaints. Previous abdominal surgery appendectomy. He has no family hx of  malignancies. He is a former smoker (8 pack year hx). Denies hematuria, CP, SOB, fevers, unexplained weight loss.\par \par Today he feels well overall except for a mild headache but he had 2 teeth removed last week and SHAHID has been relieved by advil.\par \par CT w/ and w/o at [facility] on 10/12/21: 10.1 x 7.4 x 7.4cm heterogeneous enhancing partially exophytic mass mid to lower pole of right kidney with extensive neovascularity and extension to the renal pelvis at the lower pole. No hydronephrosis. Filling defect within right main renal vein extending into infrahepatic IVC consistent with thrombosis. Few associated collateral vessels noted in right renal fossa region. No LAD.\par \par 11/10/21 Here for postop visit. Underwent right radical nephrectomy with caval thrombus 11/2. Path pending. Discharged home POD#2. Doing well. \par \par 2/16/22 Here for f/u. Undergoing adjuvant pempro with Dr. Gage. Doing well, tolerating treatment well. \par Recent labs reviewed\par Cr 1.6\par Alk phos 88\par normal LFTs\par normal CBC\par \par 5/16/22 Here for f/u. Undergoing adjuvant pempro with Dr. Gage. \par Labs reviewed today\par Cr 1.7\par Alk phos 102\par Ca 8.8\par Normal LFTs\par normal CBC [None] : None

## 2022-05-16 NOTE — LETTER BODY
[Dear  ___] : Dear  [unfilled], [Courtesy Letter:] : I had the pleasure of seeing your patient, [unfilled], in my office today. [Please see my note below.] : Please see my note below. [Consult Closing:] : Thank you very much for allowing me to participate in the care of this patient.  If you have any questions, please do not hesitate to contact me. [Sincerely,] : Sincerely, [FreeTextEntry2] : Shirin Mills MD\par [address], [address]\[place] [address]  [FreeTextEntry3] : Jaden Garrett MD, FACS\par Urologic Oncology\par Department of Urology\[facility]\par \par [employer] [facility] \par \par

## 2022-05-18 NOTE — REASON FOR VISIT
[employer] [facility] Employee Health Letter for Return to Work after Positive COVID test    5/18/2022     Dear Radha Muñoz,      We have received your email reporting a positive COVID test 5/15/22 after onset of symptoms 5/13/22. Thank you for reporting your results.    According to [facility]’s and CDC's COVID-19 guidelines, you will complete the recommended quarantine time. 5/18/22 and may return to work 5/19/22.  --------------------------------------  Team members are cleared to return to work if they meet the following criteria.    When You May (not must) Return to Work Criteria (all criteria must be met):  · A minimum of 5 days has passed since your symptoms started or positive test date if you do not have symptoms (date of symptom onset is day zero)  · Your temperature has remained less than 100.0°F for at least 24 hours without using any fever reducing medications (ibuprofen, acetaminophen, etc.)  · Significant improvement in respiratory symptoms and cough  • No new or worsening symptoms   • If you are free of fever, and do not have a persistent cough, shortness of breath, nasal congestion or weakness     Remember this is guidance and we do not encourage sick health care workers to be at work, speak with your leader if you are still ill.     YOU DO NOT NEED TO RETEST TO RETURN TO WORK     If you do not meet the above criteria, you will need to alert your leader per your sick call policy . If your symptoms remain longer than 10 days, you will need to seek care from your primary care provider or Urgent Care for symptom management and for your return to work.     Absence:   • If you continue to have a medical reason to remain off work, you will need to follow your department’s normal unscheduled absence process or seek care from your primary care provider.   • If your absence requires a leave of absence you will need to apply by calling [employer] (in [place]) at [phone] or [employer] (in [place]) at  [phone]. Medical certification by a Primary Care Provider is required to support the need for a medical leave of absence. [employer] is unable to provide this function. If you fail to provide the required medical certification, the leave will not be approved.    You must continue to adhere to strict use of appropriate personal protective equipment (PPE) while in the workplace.    Thank you,     [facility]  Email: [email or web address]  Hotline: [phone]    CC: Manager     [Follow-up Visit ___] : a follow-up visit  for [unfilled]

## 2022-05-24 ENCOUNTER — APPOINTMENT (OUTPATIENT)
Dept: INFUSION THERAPY | Facility: CLINIC | Age: 67
End: 2022-05-24

## 2022-05-24 ENCOUNTER — OUTPATIENT (OUTPATIENT)
Dept: OUTPATIENT SERVICES | Facility: HOSPITAL | Age: 67
LOS: 1 days | End: 2022-05-24
Payer: MEDICARE

## 2022-05-24 ENCOUNTER — LABORATORY RESULT (OUTPATIENT)
Age: 67
End: 2022-05-24

## 2022-05-24 ENCOUNTER — APPOINTMENT (OUTPATIENT)
Dept: HEMATOLOGY ONCOLOGY | Facility: CLINIC | Age: 67
End: 2022-05-24
Payer: MEDICARE

## 2022-05-24 VITALS
BODY MASS INDEX: 23.49 KG/M2 | SYSTOLIC BLOOD PRESSURE: 135 MMHG | WEIGHT: 155 LBS | OXYGEN SATURATION: 97 % | DIASTOLIC BLOOD PRESSURE: 86 MMHG | HEIGHT: 68 IN | RESPIRATION RATE: 18 BRPM | TEMPERATURE: 97.3 F | HEART RATE: 71 BPM

## 2022-05-24 DIAGNOSIS — Z98.49 CATARACT EXTRACTION STATUS, UNSPECIFIED EYE: Chronic | ICD-10-CM

## 2022-05-24 DIAGNOSIS — E55.9 VITAMIN D DEFICIENCY, UNSPECIFIED: ICD-10-CM

## 2022-05-24 DIAGNOSIS — C64.2 MALIGNANT NEOPLASM OF LEFT KIDNEY, EXCEPT RENAL PELVIS: ICD-10-CM

## 2022-05-24 DIAGNOSIS — Z90.49 ACQUIRED ABSENCE OF OTHER SPECIFIED PARTS OF DIGESTIVE TRACT: Chronic | ICD-10-CM

## 2022-05-24 DIAGNOSIS — Z98.890 OTHER SPECIFIED POSTPROCEDURAL STATES: Chronic | ICD-10-CM

## 2022-05-24 LAB
ALBUMIN SERPL ELPH-MCNC: 3.7 G/DL
ALP BLD-CCNC: 103 U/L
ALT SERPL-CCNC: 16 U/L
ANION GAP SERPL CALC-SCNC: 11 MMOL/L
AST SERPL-CCNC: 29 U/L
BILIRUB SERPL-MCNC: 0.6 MG/DL
BUN SERPL-MCNC: 19 MG/DL
CALCIUM SERPL-MCNC: 9.7 MG/DL
CHLORIDE SERPL-SCNC: 108 MMOL/L
CO2 SERPL-SCNC: 25 MMOL/L
CREAT SERPL-MCNC: 1.6 MG/DL
EGFR: 47 ML/MIN/1.73M2
GLUCOSE SERPL-MCNC: 120 MG/DL
POTASSIUM SERPL-SCNC: 4.6 MMOL/L
PROT SERPL-MCNC: 7 G/DL
SODIUM SERPL-SCNC: 144 MMOL/L

## 2022-05-24 PROCEDURE — 96365 THER/PROPH/DIAG IV INF INIT: CPT

## 2022-05-24 PROCEDURE — 99214 OFFICE O/P EST MOD 30 MIN: CPT | Mod: 25

## 2022-05-24 PROCEDURE — 36415 COLL VENOUS BLD VENIPUNCTURE: CPT

## 2022-05-24 PROCEDURE — 96413 CHEMO IV INFUSION 1 HR: CPT

## 2022-05-24 RX ORDER — PEMBROLIZUMAB 25 MG/ML
200 INJECTION, SOLUTION INTRAVENOUS ONCE
Refills: 0 | Status: COMPLETED | OUTPATIENT
Start: 2022-05-24 | End: 2022-05-24

## 2022-05-24 RX ADMIN — PEMBROLIZUMAB 200 MILLIGRAM(S): 25 INJECTION, SOLUTION INTRAVENOUS at 15:45

## 2022-05-24 RX ADMIN — PEMBROLIZUMAB 216 MILLIGRAM(S): 25 INJECTION, SOLUTION INTRAVENOUS at 15:13

## 2022-05-24 NOTE — ASSESSMENT
[FreeTextEntry1] : Mr. 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 Garrett. Started C1 Pembro 200mg(q3 wks) on 12/7/21, C2 Pembro 400mg(q6 wks) on 12/28/21, C3 pembro 200mg on 2/8/22. C4 Pembro was held d/t  decreased TSH level. He visited  to [facility] in March and came back to [place] now. Resumed C4 pembrolizumab 200mg on 4/12/22. Developed hypothyroidism  now on levothyroxine 88mcg with Dr. Lockhart, s/p C5 pembrolizumab on 5/3/22.  Today here for f/u with C6  pembrolizumab\par \par 10/12/21 CT kidney w/ and w/o at [facility]\par -10.1 x 7.4 x 7.4cm heterogeneous enhancing partially exophytic mass mid to lower pole of right kidney with extensive neovascularity and extension to the renal pelvis at the lower pole. No hydronephrosis. Filling defect within right main renal vein extending into infrahepatic IVC consistent with thrombosis. Few associated collateral vessels noted in right renal fossa region. No LAD.  \par \par 10/21/21 Urine Cytology\par NEGATIVE FOR HIGH GRADE UROTHELIAL CARCINOMA\par Squamous cells, urothelial cells, red blood cells, and degenerated cells.\par \par 10/28/21 CT Chest\par No suspicious pulmonary finding.\par Persistent thrombus within the right renal vein extending into the IVC.\par \par 10/28/21 MRI Abdomen\par 1. Renal mass in right lower pole involving renal pelvis suspicious for renal cell carcinoma, 8.2 cm.\par 2.Tumor thrombus within right renal vein with extension into infra hepatic IVC.\par \par 11/2/21\par Surgical Pathology Report\par Final Diagnosis\par Kidney, right; radical nephrectomy:\par - Clear cell renal cell carcinoma, Grade 3 - See Note and Synoptic\par  - Carcinoma thrombus involving the renal vein.\par - Carcinoma infiltrates perinephric renal fat.\par - Margins negative for carcinoma.\par \par Synoptic Summary\par KIDNEY: Nephrectomy\par SPECIMEN\par Procedure:  Radical nephrectomy\par Specimen Laterality:  Right\par TUMOR\par Histologic Type:  Clear cell renal cell carcinoma\par Histologic Grade:  G3: Nucleoli conspicuous and eosinophilic at 100x\par magnification\par Tumor Size: Greatest Dimension (Centimeters) -   7.5 cm\par Tumor Focality:  Unifocal\par Tumor Extension:  Tumor extension into perinephric tissue (beyond renal capsule), Tumor extension into major vein (renal vein or its segmental branches, inferior vena cava)\par Sarcomatoid Features:  Not identified\par Rhabdoid Features:  Not identified\par Tumor Necrosis:  Present\par Lymphovascular Invasion:  Present\par MARGINS\par Margins: Uninvolved by invasive carcinoma on this specimen (Clinically, tumor thrombus involving inferior vena cava removed at time of surgery)\par LYMPH NODES\par Regional Lymph Nodes:  No lymph nodes submitted or found\par Primary Tumor (pT):  pT3b\par Regional Lymph Nodes (pN):  pNX\par ADDITIONAL FINDINGS\par Pathologic Findings in Nonneoplastic Kidney:   None identified\par \par PD-L1 IMMUNOHISTOCHEMICAL ANALYSIS:\par Tumor Proportion Score: 2-5% / Positive\par \par Plan\par # Stage III  [id number], PD-L1  2-5% / Positive  G3 Renal cell carcinoma with IVC thrombosis, s/p Rt radical nephrectomy\par -CBC, CMP, TSH, Free T4\par -Received covid vaccine x3\par -Offer adjuvant Pembrolizumab x 1 year (see trial data below),\par -Started C1 Pembro on 12/7/21. d/t trip to [place] in January, pembro was increased to 400mg Q 6 weeks with C2 on 12/28/21. Tolerating tx well w/o new symptoms.  Received C3 Pembro 200mg on 2/8/22. C4 Pembro was held d/t  decreased TSH level. Further work up delayed d/t trip to [place] in March.Resumed C4 pembrolizumab 200mg on 4/12/22. Developed hypothyroidism  now on levothyroxine 88mcg with Dr. Lockhart, s/p C5 pembrolizumab on 5/3/22. Tolerating tx well. Will proceed with  C6 pembrolizumab today. \par -Will scan end of June.\par \par #Hypothyroidism/Hashimoto's  2/2 IO therapy\par - TSH 49.310 on 4/12/22, 8.93 on 4/29/22, 6.55 on 5/4/22\par -ct levothyroxine 88mcg and f/u with Dr. Lockhart\par \par # Vitamin D deficiency \par -Vit D 34.3 on 2/28/22\par -Completed Vit D 06552 once weekly x 8 weeks. Will repeat today\par \par # Elevated Cr- stable\par - Cr trending up after the nephrectomy. \par - Cr 1.60 today,  monitor closely, f/u with PCP \par \par C7 on 6/14/22, RTC with C8 after CT scans on 7/6/22\par \par Trial data:\par In a double-blind, placebo-controlled phase III trial (KEYNOTE-564), 994 patients with histologically confirmed clear cell renal carcinoma treated with nephrectomy were randomly assigned to either pembrolizumab 200 mg every three weeks for up to one year (17 cycles) or placebo. Tumors were classified as intermediate-high risk disease (ie, pT2, grade 4 or sarcomatoid, N0), high-risk disease (pT3, any grade, N0; or any pT, any grade, node positive), or M1 with no evidence of disease (ie, resection of all oligometastatic sites with no evidence of disease within one year of nephrectomy). Patients initiated adjuvant therapy within 12 weeks of undergoing nephrectomy.\par \par At median follow-up of 24 months, pembrolizumab improved DFS compared with placebo (two-year DFS 77 versus 68 percent, HR 0.68, 95% CI 0.53-0.87) [8]. DFS was consistent across all subgroups. Adjuvant pembrolizumab also demonstrated a statistically significant improvement in OS (two-year OS 97 versus 94 percent, HR 0.54, 95% CI 0.30-0.96), and further follow-up of OS is ongoing. Grade =3 treatment-related toxicity rates were higher with pembrolizumab than placebo (19 versus 1 percent), with no new toxicity profiles noted.

## 2022-05-24 NOTE — REVIEW OF SYSTEMS
[Negative] : Allergic/Immunologic [Fever] : no fever [Chills] : no chills [Night Sweats] : no night sweats [Fatigue] : no fatigue [Recent Change In Weight] : ~T no recent weight change [Skin Rash] : no skin rash [Dizziness] : no dizziness

## 2022-05-24 NOTE — HISTORY OF PRESENT ILLNESS
[de-identified] : Mr. 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 Garrett. Started C1 Pembro 200mg(q3 wks) on 12/7/21, C2 Pembro 400mg(q6 wks) on 12/28/21, C3 pembro 200mg on 2/8/22.  C4 Pembro was held d/t  decreased TSH level. He visited to [facility] in March and came back to [place] now. Resumed C4 pembrolizumab 200mg on 4/12/22. Developed hypothyroidism  now on levothyroxine 88mcg with Dr. Lockhatr. s/p C5 pembrolizumab on 5/3/22.  Today here for f/u with C6  pembrolizumab\par \par ONC Hx:\par Mr. Mills initially was seen by Dr. Brady on 10/18/21 for evaluation of renal mass. His PCP ordered US abdomen for health maintenance and he was found to have 10cm right renal mass.  He does not have any symptoms.  He had CT Kidney performed on 10/12/21 which showed 10.1 x 7.4 x 7.4cm heterogeneous enhancing partially exophytic mass mid to lower pole of right kidney with extensive neovascularity and extension to the renal pelvis at the lower pole,  infrahepatic IVC thrombosis.  He was then referred to Dr. Garrett on 10/20/21 who ordered CT chest and MRI abdomen.  CT Chest on 10/28/21 showed  no suspicious pulmonary finding, MRI abdomen on 10/28/21 with renal mass in right lower pole involving renal pelvis suspicious for renal cell carcinoma, 8.2 cm,  tumor thrombus within right renal vein with extension into infra hepatic IVC. He underwent right radical nephrectomy with  caval thrombus on 11/2/21, pathology showed [id number] Clear cell renal cell carcinoma. \par \par 10/12/21 CT kidney w/ and w/o at [facility]\par -10.1 x 7.4 x 7.4cm heterogeneous enhancing partially exophytic mass mid to lower pole of right kidney with extensive neovascularity and extension to the renal pelvis at the lower pole. No hydronephrosis. Filling defect within right main renal vein extending into infrahepatic IVC consistent with thrombosis. Few associated collateral vessels noted in right renal fossa region. No LAD.  \par \par 10/21/21 Urine Cytology\par NEGATIVE FOR HIGH GRADE UROTHELIAL CARCINOMA\par Squamous cells, urothelial cells, red blood cells, and degenerated cells.\par \par 10/28/21 CT Chest\par No suspicious pulmonary finding.\par Persistent thrombus within the right renal vein extending into the IVC.\par \par 10/28/21 MRI Abdomen\par 1. Renal mass in right lower pole involving renal pelvis suspicious for renal cell carcinoma, 8.2 cm.\par 2.Tumor thrombus within right renal vein with extension into infra hepatic IVC.\par \par 11/2/21\par Surgical Pathology Report\par Final Diagnosis\par Kidney, right; radical nephrectomy:\par - Clear cell renal cell carcinoma, Grade 3 - See Note and Synoptic\par  - Carcinoma thrombus involving the renal vein.\par - Carcinoma infiltrates perinephric renal fat.\par - Margins negative for carcinoma.\par \par Synoptic Summary\par KIDNEY: Nephrectomy\par SPECIMEN\par Procedure:  Radical nephrectomy\par Specimen Laterality:  Right\par TUMOR\par Histologic Type:  Clear cell renal cell carcinoma\par Histologic Grade:  G3: Nucleoli conspicuous and eosinophilic at 100x\par magnification\par Tumor Size: Greatest Dimension (Centimeters) -   7.5 cm\par Tumor Focality:  Unifocal\par Tumor Extension:  Tumor extension into perinephric tissue (beyond renal capsule), Tumor extension into major vein (renal vein or its segmental branches, inferior vena cava)\par Sarcomatoid Features:  Not identified\par Rhabdoid Features:  Not identified\par Tumor Necrosis:  Present\par Lymphovascular Invasion:  Present\par MARGINS\par Margins: Uninvolved by invasive carcinoma on this specimen (Clinically, tumor thrombus involving inferior vena cava removed at time of surgery)\par LYMPH NODES\par Regional Lymph Nodes:  No lymph nodes submitted or found\par Primary Tumor (pT):  pT3b\par Regional Lymph Nodes (pN):  pNX\par ADDITIONAL FINDINGS\par Pathologic Findings in Nonneoplastic Kidney:   None identified\par \par PD-L1 IMMUNOHISTOCHEMICAL ANALYSIS:\par Tumor Proportion Score: 2-5% / Positive\par  [de-identified] : He presents to clinic today for a f/u. Patient states he feels fine w/ no new symptoms. Denies rash, itchiness, chills, fevers, decreased appetite, N/V/C/D, chest pain, or abdominal pain.

## 2022-05-25 LAB — TSH SERPL-ACNC: 1.97 UIU/ML

## 2022-06-14 ENCOUNTER — APPOINTMENT (OUTPATIENT)
Dept: INFUSION THERAPY | Facility: CLINIC | Age: 67
End: 2022-06-14

## 2022-06-14 ENCOUNTER — OUTPATIENT (OUTPATIENT)
Dept: OUTPATIENT SERVICES | Facility: HOSPITAL | Age: 67
LOS: 1 days | End: 2022-06-14
Payer: MEDICARE

## 2022-06-14 VITALS
SYSTOLIC BLOOD PRESSURE: 139 MMHG | RESPIRATION RATE: 18 BRPM | HEART RATE: 72 BPM | DIASTOLIC BLOOD PRESSURE: 82 MMHG | WEIGHT: 154.1 LBS | TEMPERATURE: 98 F | HEIGHT: 64 IN

## 2022-06-14 VITALS
SYSTOLIC BLOOD PRESSURE: 129 MMHG | OXYGEN SATURATION: 96 % | DIASTOLIC BLOOD PRESSURE: 82 MMHG | HEART RATE: 64 BPM | TEMPERATURE: 99 F | RESPIRATION RATE: 18 BRPM

## 2022-06-14 DIAGNOSIS — Z98.890 OTHER SPECIFIED POSTPROCEDURAL STATES: Chronic | ICD-10-CM

## 2022-06-14 DIAGNOSIS — Z90.49 ACQUIRED ABSENCE OF OTHER SPECIFIED PARTS OF DIGESTIVE TRACT: Chronic | ICD-10-CM

## 2022-06-14 DIAGNOSIS — Z98.49 CATARACT EXTRACTION STATUS, UNSPECIFIED EYE: Chronic | ICD-10-CM

## 2022-06-14 DIAGNOSIS — C64.2 MALIGNANT NEOPLASM OF LEFT KIDNEY, EXCEPT RENAL PELVIS: ICD-10-CM

## 2022-06-14 LAB
ALBUMIN SERPL ELPH-MCNC: 3.9 G/DL — SIGNIFICANT CHANGE UP (ref 3.3–5)
ALP SERPL-CCNC: SIGNIFICANT CHANGE UP (ref 40–120)
ALT FLD-CCNC: SIGNIFICANT CHANGE UP (ref 10–45)
ANION GAP SERPL CALC-SCNC: 11 MMOL/L — SIGNIFICANT CHANGE UP (ref 5–17)
AST SERPL-CCNC: SIGNIFICANT CHANGE UP (ref 10–40)
BASOPHILS # BLD AUTO: 0.05 K/UL — SIGNIFICANT CHANGE UP (ref 0–0.2)
BASOPHILS NFR BLD AUTO: 0.8 % — SIGNIFICANT CHANGE UP (ref 0–2)
BILIRUB SERPL-MCNC: 0.3 MG/DL — SIGNIFICANT CHANGE UP (ref 0.2–1.2)
BUN SERPL-MCNC: 21 MG/DL — SIGNIFICANT CHANGE UP (ref 7–23)
CALCIUM SERPL-MCNC: 8.8 MG/DL — SIGNIFICANT CHANGE UP (ref 8.4–10.5)
CHLORIDE SERPL-SCNC: 107 MMOL/L — SIGNIFICANT CHANGE UP (ref 96–108)
CO2 SERPL-SCNC: 22 MMOL/L — SIGNIFICANT CHANGE UP (ref 22–31)
CREAT SERPL-MCNC: 1.67 MG/DL — HIGH (ref 0.5–1.3)
EGFR: 45 ML/MIN/1.73M2 — LOW
EOSINOPHIL # BLD AUTO: 0.1 K/UL — SIGNIFICANT CHANGE UP (ref 0–0.5)
EOSINOPHIL NFR BLD AUTO: 1.6 % — SIGNIFICANT CHANGE UP (ref 0–6)
GLUCOSE SERPL-MCNC: 123 MG/DL — HIGH (ref 70–99)
HCT VFR BLD CALC: 41 % — SIGNIFICANT CHANGE UP (ref 39–50)
HGB BLD-MCNC: 13.8 G/DL — SIGNIFICANT CHANGE UP (ref 13–17)
IMM GRANULOCYTES NFR BLD AUTO: 0.3 % — SIGNIFICANT CHANGE UP (ref 0–1.5)
LYMPHOCYTES # BLD AUTO: 1.41 K/UL — SIGNIFICANT CHANGE UP (ref 1–3.3)
LYMPHOCYTES # BLD AUTO: 22.9 % — SIGNIFICANT CHANGE UP (ref 13–44)
MCHC RBC-ENTMCNC: 29.1 PG — SIGNIFICANT CHANGE UP (ref 27–34)
MCHC RBC-ENTMCNC: 33.7 GM/DL — SIGNIFICANT CHANGE UP (ref 32–36)
MCV RBC AUTO: 86.5 FL — SIGNIFICANT CHANGE UP (ref 80–100)
MONOCYTES # BLD AUTO: 0.43 K/UL — SIGNIFICANT CHANGE UP (ref 0–0.9)
MONOCYTES NFR BLD AUTO: 7 % — SIGNIFICANT CHANGE UP (ref 2–14)
NEUTROPHILS # BLD AUTO: 4.14 K/UL — SIGNIFICANT CHANGE UP (ref 1.8–7.4)
NEUTROPHILS NFR BLD AUTO: 67.4 % — SIGNIFICANT CHANGE UP (ref 43–77)
NRBC # BLD: 0 /100 WBCS — SIGNIFICANT CHANGE UP (ref 0–0)
PLATELET # BLD AUTO: 272 K/UL — SIGNIFICANT CHANGE UP (ref 150–400)
POTASSIUM SERPL-MCNC: SIGNIFICANT CHANGE UP (ref 3.5–5.3)
POTASSIUM SERPL-SCNC: SIGNIFICANT CHANGE UP (ref 3.5–5.3)
PROT SERPL-MCNC: 6.9 G/DL — SIGNIFICANT CHANGE UP (ref 6–8.3)
RBC # BLD: 4.74 M/UL — SIGNIFICANT CHANGE UP (ref 4.2–5.8)
RBC # FLD: 13.1 % — SIGNIFICANT CHANGE UP (ref 10.3–14.5)
SODIUM SERPL-SCNC: 140 MMOL/L — SIGNIFICANT CHANGE UP (ref 135–145)
T4 FREE SERPL-MCNC: 1.22 NG/DL — SIGNIFICANT CHANGE UP (ref 0.93–1.7)
TSH SERPL-MCNC: 1.66 UIU/ML — SIGNIFICANT CHANGE UP (ref 0.27–4.2)
WBC # BLD: 6.15 K/UL — SIGNIFICANT CHANGE UP (ref 3.8–10.5)
WBC # FLD AUTO: 6.15 K/UL — SIGNIFICANT CHANGE UP (ref 3.8–10.5)

## 2022-06-14 PROCEDURE — 85025 COMPLETE CBC W/AUTO DIFF WBC: CPT

## 2022-06-14 PROCEDURE — 96413 CHEMO IV INFUSION 1 HR: CPT

## 2022-06-14 PROCEDURE — 84439 ASSAY OF FREE THYROXINE: CPT

## 2022-06-14 PROCEDURE — 80053 COMPREHEN METABOLIC PANEL: CPT

## 2022-06-14 PROCEDURE — 36415 COLL VENOUS BLD VENIPUNCTURE: CPT

## 2022-06-14 PROCEDURE — 84443 ASSAY THYROID STIM HORMONE: CPT

## 2022-06-14 RX ORDER — PEMBROLIZUMAB 25 MG/ML
200 INJECTION, SOLUTION INTRAVENOUS ONCE
Refills: 0 | Status: COMPLETED | OUTPATIENT
Start: 2022-06-14 | End: 2022-06-14

## 2022-06-14 RX ADMIN — PEMBROLIZUMAB 216 MILLIGRAM(S): 25 INJECTION, SOLUTION INTRAVENOUS at 15:30

## 2022-06-14 RX ADMIN — PEMBROLIZUMAB 200 MILLIGRAM(S): 25 INJECTION, SOLUTION INTRAVENOUS at 16:00

## 2022-06-15 ENCOUNTER — RESULT REVIEW (OUTPATIENT)
Age: 67
End: 2022-06-15

## 2022-06-30 ENCOUNTER — APPOINTMENT (OUTPATIENT)
Dept: CT IMAGING | Facility: HOSPITAL | Age: 67
End: 2022-06-30

## 2022-06-30 ENCOUNTER — OUTPATIENT (OUTPATIENT)
Dept: OUTPATIENT SERVICES | Facility: HOSPITAL | Age: 67
LOS: 1 days | End: 2022-06-30
Payer: MEDICARE

## 2022-06-30 DIAGNOSIS — Z98.890 OTHER SPECIFIED POSTPROCEDURAL STATES: Chronic | ICD-10-CM

## 2022-06-30 DIAGNOSIS — Z90.49 ACQUIRED ABSENCE OF OTHER SPECIFIED PARTS OF DIGESTIVE TRACT: Chronic | ICD-10-CM

## 2022-06-30 DIAGNOSIS — Z98.49 CATARACT EXTRACTION STATUS, UNSPECIFIED EYE: Chronic | ICD-10-CM

## 2022-06-30 LAB — POCT ISTAT CREATININE: 1.7 MG/DL — HIGH (ref 0.5–1.3)

## 2022-06-30 PROCEDURE — 74178 CT ABD&PLV WO CNTR FLWD CNTR: CPT

## 2022-06-30 PROCEDURE — 71260 CT THORAX DX C+: CPT

## 2022-06-30 PROCEDURE — 82565 ASSAY OF CREATININE: CPT

## 2022-06-30 PROCEDURE — 71260 CT THORAX DX C+: CPT | Mod: 26

## 2022-06-30 PROCEDURE — 74178 CT ABD&PLV WO CNTR FLWD CNTR: CPT | Mod: 26

## 2022-07-05 ENCOUNTER — APPOINTMENT (OUTPATIENT)
Dept: HEMATOLOGY ONCOLOGY | Facility: CLINIC | Age: 67
End: 2022-07-05

## 2022-07-06 ENCOUNTER — LABORATORY RESULT (OUTPATIENT)
Age: 67
End: 2022-07-06

## 2022-07-06 ENCOUNTER — APPOINTMENT (OUTPATIENT)
Dept: INFUSION THERAPY | Facility: CLINIC | Age: 67
End: 2022-07-06

## 2022-07-06 ENCOUNTER — OUTPATIENT (OUTPATIENT)
Dept: OUTPATIENT SERVICES | Facility: HOSPITAL | Age: 67
LOS: 1 days | End: 2022-07-06
Payer: MEDICARE

## 2022-07-06 ENCOUNTER — APPOINTMENT (OUTPATIENT)
Dept: HEMATOLOGY ONCOLOGY | Facility: CLINIC | Age: 67
End: 2022-07-06

## 2022-07-06 VITALS
OXYGEN SATURATION: 95 % | DIASTOLIC BLOOD PRESSURE: 82 MMHG | TEMPERATURE: 97 F | SYSTOLIC BLOOD PRESSURE: 136 MMHG | RESPIRATION RATE: 17 BRPM | HEIGHT: 64 IN | HEART RATE: 56 BPM | WEIGHT: 153 LBS

## 2022-07-06 VITALS
TEMPERATURE: 97.4 F | RESPIRATION RATE: 18 BRPM | HEART RATE: 56 BPM | WEIGHT: 153 LBS | BODY MASS INDEX: 23.19 KG/M2 | HEIGHT: 68 IN | OXYGEN SATURATION: 95 % | DIASTOLIC BLOOD PRESSURE: 82 MMHG | SYSTOLIC BLOOD PRESSURE: 136 MMHG

## 2022-07-06 DIAGNOSIS — Z98.890 OTHER SPECIFIED POSTPROCEDURAL STATES: Chronic | ICD-10-CM

## 2022-07-06 DIAGNOSIS — Z87.898 PERSONAL HISTORY OF OTHER SPECIFIED CONDITIONS: ICD-10-CM

## 2022-07-06 DIAGNOSIS — Z90.49 ACQUIRED ABSENCE OF OTHER SPECIFIED PARTS OF DIGESTIVE TRACT: Chronic | ICD-10-CM

## 2022-07-06 DIAGNOSIS — Z98.49 CATARACT EXTRACTION STATUS, UNSPECIFIED EYE: Chronic | ICD-10-CM

## 2022-07-06 DIAGNOSIS — C64.2 MALIGNANT NEOPLASM OF LEFT KIDNEY, EXCEPT RENAL PELVIS: ICD-10-CM

## 2022-07-06 DIAGNOSIS — N28.89 OTHER SPECIFIED DISORDERS OF KIDNEY AND URETER: ICD-10-CM

## 2022-07-06 DIAGNOSIS — I82.3 EMBOLISM AND THROMBOSIS OF RENAL VEIN: ICD-10-CM

## 2022-07-06 DIAGNOSIS — Z01.810 ENCOUNTER FOR PREPROCEDURAL CARDIOVASCULAR EXAMINATION: ICD-10-CM

## 2022-07-06 PROCEDURE — 96413 CHEMO IV INFUSION 1 HR: CPT

## 2022-07-06 PROCEDURE — 99215 OFFICE O/P EST HI 40 MIN: CPT

## 2022-07-06 RX ORDER — AMOXICILLIN 500 MG/1
500 CAPSULE ORAL
Qty: 21 | Refills: 0 | Status: DISCONTINUED | COMMUNITY
Start: 2022-04-13

## 2022-07-06 RX ORDER — PEMBROLIZUMAB 25 MG/ML
200 INJECTION, SOLUTION INTRAVENOUS ONCE
Refills: 0 | Status: COMPLETED | OUTPATIENT
Start: 2022-07-06 | End: 2022-07-06

## 2022-07-06 RX ADMIN — PEMBROLIZUMAB 200 MILLIGRAM(S): 25 INJECTION, SOLUTION INTRAVENOUS at 14:40

## 2022-07-06 RX ADMIN — PEMBROLIZUMAB 200 MILLIGRAM(S): 25 INJECTION, SOLUTION INTRAVENOUS at 15:10

## 2022-07-06 NOTE — HISTORY OF PRESENT ILLNESS
[de-identified] : Mr. 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 Garrett. Started C1 Pembro 200mg(q3 wks) on 12/7/21, C2 Pembro 400mg(q6 wks) on 12/28/21, C3 pembro 200mg on 2/8/22.  C4 Pembro was held d/t  decreased TSH level. He visited to [facility] in March and came back to [place] now. Resumed C4 pembrolizumab 200mg on 4/12/22. Developed hypothyroidism  now on levothyroxine 88mcg with Dr. Lockhart. s/p C7 pembrolizumab on 6/24/22. Today here for f/u with C8  pembrolizumab\par \par ONC Hx:\par Mr. Mills initially was seen by Dr. Brady on 10/18/21 for evaluation of renal mass. His PCP ordered US abdomen for health maintenance and he was found to have 10cm right renal mass.  He does not have any symptoms.  He had CT Kidney performed on 10/12/21 which showed 10.1 x 7.4 x 7.4cm heterogeneous enhancing partially exophytic mass mid to lower pole of right kidney with extensive neovascularity and extension to the renal pelvis at the lower pole,  infrahepatic IVC thrombosis.  He was then referred to Dr. Garrett on 10/20/21 who ordered CT chest and MRI abdomen.  CT Chest on 10/28/21 showed  no suspicious pulmonary finding, MRI abdomen on 10/28/21 with renal mass in right lower pole involving renal pelvis suspicious for renal cell carcinoma, 8.2 cm,  tumor thrombus within right renal vein with extension into infra hepatic IVC. He underwent right radical nephrectomy with  caval thrombus on 11/2/21, pathology showed [id number] Clear cell renal cell carcinoma. \par \par 10/12/21 CT kidney w/ and w/o at [facility]\par -10.1 x 7.4 x 7.4cm heterogeneous enhancing partially exophytic mass mid to lower pole of right kidney with extensive neovascularity and extension to the renal pelvis at the lower pole. No hydronephrosis. Filling defect within right main renal vein extending into infrahepatic IVC consistent with thrombosis. Few associated collateral vessels noted in right renal fossa region. No LAD.  \par \par 10/21/21 Urine Cytology\par NEGATIVE FOR HIGH GRADE UROTHELIAL CARCINOMA\par Squamous cells, urothelial cells, red blood cells, and degenerated cells.\par \par 10/28/21 CT Chest\par No suspicious pulmonary finding.\par Persistent thrombus within the right renal vein extending into the IVC.\par \par 10/28/21 MRI Abdomen\par 1. Renal mass in right lower pole involving renal pelvis suspicious for renal cell carcinoma, 8.2 cm.\par 2.Tumor thrombus within right renal vein with extension into infra hepatic IVC.\par \par 11/2/21\par Surgical Pathology Report\par Final Diagnosis\par Kidney, right; radical nephrectomy:\par - Clear cell renal cell carcinoma, Grade 3 - See Note and Synoptic\par  - Carcinoma thrombus involving the renal vein.\par - Carcinoma infiltrates perinephric renal fat.\par - Margins negative for carcinoma.\par \par Synoptic Summary\par KIDNEY: Nephrectomy\par SPECIMEN\par Procedure:  Radical nephrectomy\par Specimen Laterality:  Right\par TUMOR\par Histologic Type:  Clear cell renal cell carcinoma\par Histologic Grade:  G3: Nucleoli conspicuous and eosinophilic at 100x\par magnification\par Tumor Size: Greatest Dimension (Centimeters) -   7.5 cm\par Tumor Focality:  Unifocal\par Tumor Extension:  Tumor extension into perinephric tissue (beyond renal capsule), Tumor extension into major vein (renal vein or its segmental branches, inferior vena cava)\par Sarcomatoid Features:  Not identified\par Rhabdoid Features:  Not identified\par Tumor Necrosis:  Present\par Lymphovascular Invasion:  Present\par MARGINS\par Margins: Uninvolved by invasive carcinoma on this specimen (Clinically, tumor thrombus involving inferior vena cava removed at time of surgery)\par LYMPH NODES\par Regional Lymph Nodes:  No lymph nodes submitted or found\par Primary Tumor (pT):  pT3b\par Regional Lymph Nodes (pN):  pNX\par ADDITIONAL FINDINGS\par Pathologic Findings in Nonneoplastic Kidney:   None identified\par \par PD-L1 IMMUNOHISTOCHEMICAL ANALYSIS:\par Tumor Proportion Score: 2-5% / Positive\par \par 6/30/22 CT CAP\par 1. Since 10/28/2021, there has been right nephrectomy.\par 2. New 0.8 cm enhancing mass in the periampullary portion of the duodenum. Recommend endoscopy to evaluate for possible metastatic or primary neoplasm versus bulging papilla.\par 3. New consolidation surrounding calcific densities in anterior segment of right upper lobe. The calcific densities could represent broncholiths versus aspirated material if patient had been prone at the time. There could be surrounding infection. Recommend bronchoscopy. [de-identified] : He presents to clinic today for a f/u. Patient states he feels fine with mild fatigue.  Denies rash, itchiness, chills, fevers, decreased appetite, N/V/C/D, chest pain, or abdominal pain.  Pt also denies cough and SOB.  he states that he had a cough a few months ago after returning from a trip overseas but this has resolved.

## 2022-07-06 NOTE — PHYSICAL EXAM
[Normal] : affect appropriate [Fully active, able to carry on all pre-disease performance without restriction] : Status 0 - Fully active, able to carry on all pre-disease performance without restriction [Thin] : thin

## 2022-07-06 NOTE — ASSESSMENT
[FreeTextEntry1] : Mr. 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 Garrett. Started C1 Pembro 200mg(q3 wks) on 12/7/21, C2 Pembro 400mg(q6 wks) on 12/28/21, C3 pembro 200mg on 2/8/22. C4 Pembro was held d/t  decreased TSH level. He visited  to [facility] in March and came back to [place] now. Resumed C4 pembrolizumab 200mg on 4/12/22. Developed hypothyroidism  now on levothyroxine 88mcg with Dr. Lockhart, s/p C7 pembrolizumab on 6/24/22. Today here for f/u with C8  pembrolizumab\par \par \par Plan\par # Stage III  [id number], PD-L1  2-5% / Positive  G3 Renal cell carcinoma with IVC thrombosis, s/p Rt radical nephrectomy\par -CBC, CMP, TSH, Free T4\par -Received covid vaccine x3\par -Offer adjuvant Pembrolizumab x 1 year (see trial data below),\par -Started C1 Pembro on 12/7/21. d/t trip to [place] in January, pembro was increased to 400mg Q 6 weeks with C2 on 12/28/21. Tolerating tx well w/o new symptoms.  Received C3 Pembro 200mg on 2/8/22. C4 Pembro was held d/t  decreased TSH level. Further work up delayed d/t trip to [place] in March.  Resumed C4 pembrolizumab 200mg on 4/12/22. Developed hypothyroidism  now on levothyroxine 88mcg with Dr. Lockhart, s/p C7 pembrolizumab on 6/14/22.\par \par --discussed CT scan results with patient.  No convincing evidence of recurrent or metastatic disease.  "Calcified densities" in the RUL noted- patient is asymptomatic at this time, will monitor on subsequent surveillance imaging.  etiology of duodenal nodule is uncertain - will refer to GI for workup.  \par -- Tolerating tx well. Will proceed with  C8 pembrolizumab today.  He will receive 200 mg dose at this visit; plan to adjust dose to 400 mg and treat every 6 weeks per FDA label when patient returns for next treatment.  current plan is to complete 1 year of treatment (12/2022) as long as the patient proceeds w/o any treatment-limiting immune-mediated toxicities\par --repeat surveillance scans after another 6 month interval (12/2022) barring problems in the meantime\par \par #Hypothyroidism/Hashimoto's  2/2 IO therapy\par - Recent TSH 1.660 on 6/14/22\par -ct levothyroxine 88mcg and f/u with Dr. Lockhart\par \par # duodenal nodule\par -- GI referral as above\par \par # CKD \par --monitoring creatinine prior to each pembrolizumab treatment.\par \par RTC with C9 on 7/26/22\par \par

## 2022-07-11 LAB
T4 FREE SERPL-MCNC: 1.6 NG/DL
TSH SERPL-ACNC: 0.75 UIU/ML

## 2022-07-11 NOTE — HISTORY OF PRESENT ILLNESS
[de-identified] : Mr. 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 Garrett. Started C1 Pembro 200mg(q3 wks) on 12/7/21, C2 Pembro 400mg(q6 wks) on 12/28/21, C3 pembro 200mg on 2/8/22.  C4 Pembro was held d/t  decreased TSH level. He visited to [facility] in March and came back to [place] now. Resumed C4 pembrolizumab 200mg on 4/12/22. Developed hypothyroidism  now on levothyroxine 88mcg with Dr. Lockhart. s/p C5 pembrolizumab on 5/3/22.  Today here for f/u with C6  pembrolizumab\par \par ONC Hx:\par Mr. Mills initially was seen by Dr. Brady on 10/18/21 for evaluation of renal mass. His PCP ordered US abdomen for health maintenance and he was found to have 10cm right renal mass.  He does not have any symptoms.  He had CT Kidney performed on 10/12/21 which showed 10.1 x 7.4 x 7.4cm heterogeneous enhancing partially exophytic mass mid to lower pole of right kidney with extensive neovascularity and extension to the renal pelvis at the lower pole,  infrahepatic IVC thrombosis.  He was then referred to Dr. Garrett on 10/20/21 who ordered CT chest and MRI abdomen.  CT Chest on 10/28/21 showed  no suspicious pulmonary finding, MRI abdomen on 10/28/21 with renal mass in right lower pole involving renal pelvis suspicious for renal cell carcinoma, 8.2 cm,  tumor thrombus within right renal vein with extension into infra hepatic IVC. He underwent right radical nephrectomy with  caval thrombus on 11/2/21, pathology showed [id number] Clear cell renal cell carcinoma. \par \par 10/12/21 CT kidney w/ and w/o at [facility]\par -10.1 x 7.4 x 7.4cm heterogeneous enhancing partially exophytic mass mid to lower pole of right kidney with extensive neovascularity and extension to the renal pelvis at the lower pole. No hydronephrosis. Filling defect within right main renal vein extending into infrahepatic IVC consistent with thrombosis. Few associated collateral vessels noted in right renal fossa region. No LAD.  \par \par 10/21/21 Urine Cytology\par NEGATIVE FOR HIGH GRADE UROTHELIAL CARCINOMA\par Squamous cells, urothelial cells, red blood cells, and degenerated cells.\par \par 10/28/21 CT Chest\par No suspicious pulmonary finding.\par Persistent thrombus within the right renal vein extending into the IVC.\par \par 10/28/21 MRI Abdomen\par 1. Renal mass in right lower pole involving renal pelvis suspicious for renal cell carcinoma, 8.2 cm.\par 2.Tumor thrombus within right renal vein with extension into infra hepatic IVC.\par \par 11/2/21\par Surgical Pathology Report\par Final Diagnosis\par Kidney, right; radical nephrectomy:\par - Clear cell renal cell carcinoma, Grade 3 - See Note and Synoptic\par  - Carcinoma thrombus involving the renal vein.\par - Carcinoma infiltrates perinephric renal fat.\par - Margins negative for carcinoma.\par \par Synoptic Summary\par KIDNEY: Nephrectomy\par SPECIMEN\par Procedure:  Radical nephrectomy\par Specimen Laterality:  Right\par TUMOR\par Histologic Type:  Clear cell renal cell carcinoma\par Histologic Grade:  G3: Nucleoli conspicuous and eosinophilic at 100x\par magnification\par Tumor Size: Greatest Dimension (Centimeters) -   7.5 cm\par Tumor Focality:  Unifocal\par Tumor Extension:  Tumor extension into perinephric tissue (beyond renal capsule), Tumor extension into major vein (renal vein or its segmental branches, inferior vena cava)\par Sarcomatoid Features:  Not identified\par Rhabdoid Features:  Not identified\par Tumor Necrosis:  Present\par Lymphovascular Invasion:  Present\par MARGINS\par Margins: Uninvolved by invasive carcinoma on this specimen (Clinically, tumor thrombus involving inferior vena cava removed at time of surgery)\par LYMPH NODES\par Regional Lymph Nodes:  No lymph nodes submitted or found\par Primary Tumor (pT):  pT3b\par Regional Lymph Nodes (pN):  pNX\par ADDITIONAL FINDINGS\par Pathologic Findings in Nonneoplastic Kidney:   None identified\par \par PD-L1 IMMUNOHISTOCHEMICAL ANALYSIS:\par Tumor Proportion Score: 2-5% / Positive\par  [de-identified] : He presents to clinic today for a f/u. Patient states he feels fine w/ no new symptoms. Denies rash, itchiness, chills, fevers, decreased appetite, N/V/C/D, chest pain, or abdominal pain.

## 2022-07-11 NOTE — ASSESSMENT
[FreeTextEntry1] : Mr. 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 Garrett. Started C1 Pembro 200mg(q3 wks) on 12/7/21, C2 Pembro 400mg(q6 wks) on 12/28/21, C3 pembro 200mg on 2/8/22. C4 Pembro was held d/t  decreased TSH level. He visited  to [facility] in March and came back to [place] now. Resumed C4 pembrolizumab 200mg on 4/12/22. Developed hypothyroidism  now on levothyroxine 88mcg with Dr. Lockhart, s/p C5 pembrolizumab on 5/3/22.  Today here for f/u with C6  pembrolizumab\par \par 10/12/21 CT kidney w/ and w/o at [facility]\par -10.1 x 7.4 x 7.4cm heterogeneous enhancing partially exophytic mass mid to lower pole of right kidney with extensive neovascularity and extension to the renal pelvis at the lower pole. No hydronephrosis. Filling defect within right main renal vein extending into infrahepatic IVC consistent with thrombosis. Few associated collateral vessels noted in right renal fossa region. No LAD.  \par \par 10/21/21 Urine Cytology\par NEGATIVE FOR HIGH GRADE UROTHELIAL CARCINOMA\par Squamous cells, urothelial cells, red blood cells, and degenerated cells.\par \par 10/28/21 CT Chest\par No suspicious pulmonary finding.\par Persistent thrombus within the right renal vein extending into the IVC.\par \par 10/28/21 MRI Abdomen\par 1. Renal mass in right lower pole involving renal pelvis suspicious for renal cell carcinoma, 8.2 cm.\par 2.Tumor thrombus within right renal vein with extension into infra hepatic IVC.\par \par 11/2/21\par Surgical Pathology Report\par Final Diagnosis\par Kidney, right; radical nephrectomy:\par - Clear cell renal cell carcinoma, Grade 3 - See Note and Synoptic\par  - Carcinoma thrombus involving the renal vein.\par - Carcinoma infiltrates perinephric renal fat.\par - Margins negative for carcinoma.\par \par Synoptic Summary\par KIDNEY: Nephrectomy\par SPECIMEN\par Procedure:  Radical nephrectomy\par Specimen Laterality:  Right\par TUMOR\par Histologic Type:  Clear cell renal cell carcinoma\par Histologic Grade:  G3: Nucleoli conspicuous and eosinophilic at 100x\par magnification\par Tumor Size: Greatest Dimension (Centimeters) -   7.5 cm\par Tumor Focality:  Unifocal\par Tumor Extension:  Tumor extension into perinephric tissue (beyond renal capsule), Tumor extension into major vein (renal vein or its segmental branches, inferior vena cava)\par Sarcomatoid Features:  Not identified\par Rhabdoid Features:  Not identified\par Tumor Necrosis:  Present\par Lymphovascular Invasion:  Present\par MARGINS\par Margins: Uninvolved by invasive carcinoma on this specimen (Clinically, tumor thrombus involving inferior vena cava removed at time of surgery)\par LYMPH NODES\par Regional Lymph Nodes:  No lymph nodes submitted or found\par Primary Tumor (pT):  pT3b\par Regional Lymph Nodes (pN):  pNX\par ADDITIONAL FINDINGS\par Pathologic Findings in Nonneoplastic Kidney:   None identified\par \par PD-L1 IMMUNOHISTOCHEMICAL ANALYSIS:\par Tumor Proportion Score: 2-5% / Positive\par \par Plan\par # Stage III  [id number], PD-L1  2-5% / Positive  G3 Renal cell carcinoma with IVC thrombosis, s/p Rt radical nephrectomy\par -CBC, CMP, TSH, Free T4\par -Received covid vaccine x3\par -Offer adjuvant Pembrolizumab x 1 year (see trial data below),\par -Started C1 Pembro on 12/7/21. d/t trip to [place] in January, pembro was increased to 400mg Q 6 weeks with C2 on 12/28/21. Tolerating tx well w/o new symptoms.  Received C3 Pembro 200mg on 2/8/22. C4 Pembro was held d/t  decreased TSH level. Further work up delayed d/t trip to [place] in March.Resumed C4 pembrolizumab 200mg on 4/12/22. Developed hypothyroidism  now on levothyroxine 88mcg with Dr. Lockhart, s/p C5 pembrolizumab on 5/3/22. Tolerating tx well. Will proceed with  C6 pembrolizumab today. \par -Will scan end of June.\par \par #Hypothyroidism/Hashimoto's  2/2 IO therapy\par - TSH 49.310 on 4/12/22, 8.93 on 4/29/22, 6.55 on 5/4/22\par -ct levothyroxine 88mcg and f/u with Dr. Lockhart\par \par # Vitamin D deficiency \par -Vit D 34.3 on 2/28/22\par -Completed Vit D 23628 once weekly x 8 weeks. Will repeat today\par \par # Elevated Cr- stable\par - Cr trending up after the nephrectomy. \par - Cr 1.60 today,  monitor closely, f/u with PCP \par \par C7 on 6/14/22, RTC with C8 after CT scans on 7/6/22\par \par Trial data:\par In a double-blind, placebo-controlled phase III trial ([id number]), [id number] patients with histologically confirmed clear cell renal carcinoma treated with nephrectomy were randomly assigned to either pembrolizumab 200 mg every three weeks for up to one year (17 cycles) or placebo. Tumors were classified as intermediate-high risk disease (ie, pT2, grade 4 or sarcomatoid, N0), high-risk disease (pT3, any grade, N0; or any pT, any grade, node positive), or M1 with no evidence of disease (ie, resection of all oligometastatic sites with no evidence of disease within one year of nephrectomy). Patients initiated adjuvant therapy within 12 weeks of undergoing nephrectomy.\par \par At median follow-up of 24 months, pembrolizumab improved DFS compared with placebo (two-year DFS 77 versus 68 percent, HR 0.68, 95% CI 0.53-0.87) [8]. DFS was consistent across all subgroups. Adjuvant pembrolizumab also demonstrated a statistically significant improvement in OS (two-year OS 97 versus 94 percent, HR 0.54, 95% CI 0.30-0.96), and further follow-up of OS is ongoing. Grade =3 treatment-related toxicity rates were higher with pembrolizumab than placebo (19 versus 1 percent), with no new toxicity profiles noted.

## 2022-07-26 ENCOUNTER — APPOINTMENT (OUTPATIENT)
Dept: HEMATOLOGY ONCOLOGY | Facility: CLINIC | Age: 67
End: 2022-07-26

## 2022-07-26 ENCOUNTER — APPOINTMENT (OUTPATIENT)
Dept: INFUSION THERAPY | Facility: CLINIC | Age: 67
End: 2022-07-26

## 2022-07-26 ENCOUNTER — OUTPATIENT (OUTPATIENT)
Dept: OUTPATIENT SERVICES | Facility: HOSPITAL | Age: 67
LOS: 1 days | End: 2022-07-26
Payer: MEDICARE

## 2022-07-26 VITALS
DIASTOLIC BLOOD PRESSURE: 81 MMHG | SYSTOLIC BLOOD PRESSURE: 149 MMHG | OXYGEN SATURATION: 94 % | HEIGHT: 64 IN | HEART RATE: 73 BPM | WEIGHT: 153 LBS | RESPIRATION RATE: 17 BRPM | TEMPERATURE: 97 F

## 2022-07-26 VITALS
HEART RATE: 73 BPM | WEIGHT: 153 LBS | SYSTOLIC BLOOD PRESSURE: 149 MMHG | DIASTOLIC BLOOD PRESSURE: 81 MMHG | TEMPERATURE: 96.8 F | BODY MASS INDEX: 23.19 KG/M2 | RESPIRATION RATE: 18 BRPM | OXYGEN SATURATION: 94 % | HEIGHT: 68 IN

## 2022-07-26 DIAGNOSIS — C64.2 MALIGNANT NEOPLASM OF LEFT KIDNEY, EXCEPT RENAL PELVIS: ICD-10-CM

## 2022-07-26 DIAGNOSIS — Z90.49 ACQUIRED ABSENCE OF OTHER SPECIFIED PARTS OF DIGESTIVE TRACT: Chronic | ICD-10-CM

## 2022-07-26 DIAGNOSIS — Z98.890 OTHER SPECIFIED POSTPROCEDURAL STATES: Chronic | ICD-10-CM

## 2022-07-26 DIAGNOSIS — Z98.49 CATARACT EXTRACTION STATUS, UNSPECIFIED EYE: Chronic | ICD-10-CM

## 2022-07-26 LAB
ALBUMIN SERPL ELPH-MCNC: 3.5 G/DL
ALP BLD-CCNC: 101 U/L
ALT SERPL-CCNC: 17 U/L
ANION GAP SERPL CALC-SCNC: 8 MMOL/L
AST SERPL-CCNC: 30 U/L
BASOPHILS # BLD AUTO: 0.04 K/UL
BASOPHILS NFR BLD AUTO: 0.7 %
BILIRUB SERPL-MCNC: 0.9 MG/DL
BUN SERPL-MCNC: 16 MG/DL
CALCIUM SERPL-MCNC: 8.9 MG/DL
CHLORIDE SERPL-SCNC: 104 MMOL/L
CO2 SERPL-SCNC: 28 MMOL/L
CREAT SERPL-MCNC: 1.9 MG/DL
EGFR: 38 ML/MIN/1.73M2
EOSINOPHIL # BLD AUTO: 0.11 K/UL
EOSINOPHIL NFR BLD AUTO: 1.9 %
GLUCOSE SERPL-MCNC: 101 MG/DL
HCT VFR BLD CALC: 41.6 %
HGB BLD-MCNC: 13.6 G/DL
IMM GRANULOCYTES NFR BLD AUTO: 0.4 %
LYMPHOCYTES # BLD AUTO: 1.38 K/UL
LYMPHOCYTES NFR BLD AUTO: 24.2 %
MAN DIFF?: NORMAL
MCHC RBC-ENTMCNC: 28.3 PG
MCHC RBC-ENTMCNC: 32.7 GM/DL
MCV RBC AUTO: 86.7 FL
MONOCYTES # BLD AUTO: 0.46 K/UL
MONOCYTES NFR BLD AUTO: 8.1 %
NEUTROPHILS # BLD AUTO: 3.69 K/UL
NEUTROPHILS NFR BLD AUTO: 64.7 %
PLATELET # BLD AUTO: 216 K/UL
POTASSIUM SERPL-SCNC: 4.7 MMOL/L
PROT SERPL-MCNC: 6.5 G/DL
RBC # BLD: 4.8 M/UL
RBC # FLD: 13.1 %
SODIUM SERPL-SCNC: 140 MMOL/L
WBC # FLD AUTO: 5.7 K/UL

## 2022-07-26 PROCEDURE — 96413 CHEMO IV INFUSION 1 HR: CPT

## 2022-07-26 PROCEDURE — 99214 OFFICE O/P EST MOD 30 MIN: CPT

## 2022-07-26 RX ORDER — BETAMETHASONE DIPROPIONATE 0.5 MG/G
0.05 CREAM TOPICAL TWICE DAILY
Qty: 2 | Refills: 3 | Status: DISCONTINUED | COMMUNITY
Start: 2022-02-28 | End: 2022-07-26

## 2022-07-26 RX ORDER — PEMBROLIZUMAB 25 MG/ML
400 INJECTION, SOLUTION INTRAVENOUS ONCE
Refills: 0 | Status: COMPLETED | OUTPATIENT
Start: 2022-07-26 | End: 2022-07-26

## 2022-07-26 RX ADMIN — PEMBROLIZUMAB 400 MILLIGRAM(S): 25 INJECTION, SOLUTION INTRAVENOUS at 15:27

## 2022-07-26 RX ADMIN — PEMBROLIZUMAB 400 MILLIGRAM(S): 25 INJECTION, SOLUTION INTRAVENOUS at 14:57

## 2022-07-26 NOTE — REVIEW OF SYSTEMS
[Fatigue] : fatigue [Negative] : Allergic/Immunologic [Fever] : no fever [Chills] : no chills [Night Sweats] : no night sweats [Recent Change In Weight] : ~T no recent weight change [Dysuria] : no dysuria [Incontinence] : no incontinence [Skin Rash] : no skin rash [Dizziness] : no dizziness [FreeTextEntry8] : frequent urination

## 2022-07-26 NOTE — ASSESSMENT
[FreeTextEntry1] : Mr. 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 Garrett. Started C1 Pembro 200mg(q3 wks) on 12/7/21, C2 Pembro 400mg(q6 wks) on 12/28/21, C3 pembro 200mg on 2/8/22. C4 Pembro was held d/t  decreased TSH level. He visited  to [facility] in March and came back to [place] now. Resumed C4 pembrolizumab 200mg on 4/12/22. Developed hypothyroidism  now on levothyroxine 88mcg with Dr. Lockhart, s/p C8 pembrolizumab on 7/5/22. Today here for f/u with C9D1  pembrolizumab(400mg every 6 weeks)\par \par Plan\par # Stage III  [id number], PD-L1  2-5% / Positive  G3 Renal cell carcinoma with IVC thrombosis, s/p Rt radical nephrectomy\par -CBC, CMP\par -TSH, Free T4 ( q 6weeks )\par -Received covid vaccine x3\par -Offer adjuvant Pembrolizumab x 1 year (see trial data below),\par -Started C1 Pembro on 12/7/21. d/t trip to [place] in January, pembro was increased to 400mg Q 6 weeks with C2 on 12/28/21. Tolerating tx well w/o new symptoms.  Received C3 Pembro 200mg on 2/8/22. C4 Pembro was held d/t  decreased TSH level. Further work up delayed d/t trip to [place] in March.  Resumed C4 pembrolizumab 200mg on 4/12/22. Developed hypothyroidism  now on levothyroxine 88mcg with Dr. Lockhart, s/p C8  pembrolizumab on 7/5/22. \par -discussed CT scan results with patient.  No convincing evidence of recurrent or metastatic disease.  "Calcified densities" in the RUL noted- patient is asymptomatic at this time, will monitor on subsequent surveillance imaging.  etiology of duodenal nodule is uncertain - pending GI referral for workup.  \par -- Tolerating tx well. Will proceed with  C9 pembrolizumab 400mg today, every 6 weeks per FDA label,  current plan is to complete 1 year of treatment (12/2022) as long as the patient proceeds w/o any treatment-limiting immune-mediated toxicities\par --repeat surveillance scans after another 6 month interval (12/2022) barring problems in the meantime\par \par #Hypothyroidism/Hashimoto's  2/2 IO therapy\par - Recent TSH 1.660 on 6/14/22\par -ct levothyroxine 88mcg and f/u with Dr. Lockhart\par \par # duodenal nodule\par -- GI referral as above\par \par # CKD - stable \par --monitoring creatinine prior to each pembrolizumab treatment.\par \par RTC with C10 on 9/6/22\par \par \par

## 2022-07-26 NOTE — HISTORY OF PRESENT ILLNESS
[de-identified] : Mr. 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 Garrett. Started C1 Pembro 200mg(q3 wks) on 12/7/21, C2 Pembro 400mg(q6 wks) on 12/28/21, C3 pembro 200mg on 2/8/22.  C4 Pembro was held d/t  decreased TSH level. He visited to [facility] in March and came back to [place] now. Resumed C4 pembrolizumab 200mg on 4/12/22. Developed hypothyroidism  now on levothyroxine 88mcg with Dr. Lockhart. s/p C8 pembrolizumab on 7/5/22. Today here for f/u with C9D1  pembrolizumab(400mg every 6 weeks)\par \par ONC Hx:\par Mr. Mills initially was seen by Dr. Brady on 10/18/21 for evaluation of renal mass. His PCP ordered US abdomen for health maintenance and he was found to have 10cm right renal mass.  He does not have any symptoms.  He had CT Kidney performed on 10/12/21 which showed 10.1 x 7.4 x 7.4cm heterogeneous enhancing partially exophytic mass mid to lower pole of right kidney with extensive neovascularity and extension to the renal pelvis at the lower pole,  infrahepatic IVC thrombosis.  He was then referred to Dr. Garrett on 10/20/21 who ordered CT chest and MRI abdomen.  CT Chest on 10/28/21 showed  no suspicious pulmonary finding, MRI abdomen on 10/28/21 with renal mass in right lower pole involving renal pelvis suspicious for renal cell carcinoma, 8.2 cm,  tumor thrombus within right renal vein with extension into infra hepatic IVC. He underwent right radical nephrectomy with  caval thrombus on 11/2/21, pathology showed [id number] Clear cell renal cell carcinoma. \par \par 10/12/21 CT kidney w/ and w/o at [facility]\par -10.1 x 7.4 x 7.4cm heterogeneous enhancing partially exophytic mass mid to lower pole of right kidney with extensive neovascularity and extension to the renal pelvis at the lower pole. No hydronephrosis. Filling defect within right main renal vein extending into infrahepatic IVC consistent with thrombosis. Few associated collateral vessels noted in right renal fossa region. No LAD.  \par \par 10/21/21 Urine Cytology\par NEGATIVE FOR HIGH GRADE UROTHELIAL CARCINOMA\par Squamous cells, urothelial cells, red blood cells, and degenerated cells.\par \par 10/28/21 CT Chest\par No suspicious pulmonary finding.\par Persistent thrombus within the right renal vein extending into the IVC.\par \par 10/28/21 MRI Abdomen\par 1. Renal mass in right lower pole involving renal pelvis suspicious for renal cell carcinoma, 8.2 cm.\par 2.Tumor thrombus within right renal vein with extension into infra hepatic IVC.\par \par 11/2/21\par Surgical Pathology Report\par Final Diagnosis\par Kidney, right; radical nephrectomy:\par - Clear cell renal cell carcinoma, Grade 3 - See Note and Synoptic\par  - Carcinoma thrombus involving the renal vein.\par - Carcinoma infiltrates perinephric renal fat.\par - Margins negative for carcinoma.\par \par Synoptic Summary\par KIDNEY: Nephrectomy\par SPECIMEN\par Procedure:  Radical nephrectomy\par Specimen Laterality:  Right\par TUMOR\par Histologic Type:  Clear cell renal cell carcinoma\par Histologic Grade:  G3: Nucleoli conspicuous and eosinophilic at 100x\par magnification\par Tumor Size: Greatest Dimension (Centimeters) -   7.5 cm\par Tumor Focality:  Unifocal\par Tumor Extension:  Tumor extension into perinephric tissue (beyond renal capsule), Tumor extension into major vein (renal vein or its segmental branches, inferior vena cava)\par Sarcomatoid Features:  Not identified\par Rhabdoid Features:  Not identified\par Tumor Necrosis:  Present\par Lymphovascular Invasion:  Present\par MARGINS\par Margins: Uninvolved by invasive carcinoma on this specimen (Clinically, tumor thrombus involving inferior vena cava removed at time of surgery)\par LYMPH NODES\par Regional Lymph Nodes:  No lymph nodes submitted or found\par Primary Tumor (pT):  pT3b\par Regional Lymph Nodes (pN):  pNX\par ADDITIONAL FINDINGS\par Pathologic Findings in Nonneoplastic Kidney:   None identified\par \par PD-L1 IMMUNOHISTOCHEMICAL ANALYSIS:\par Tumor Proportion Score: 2-5% / Positive\par \par 6/30/22 CT CAP\par 1. Since 10/28/2021, there has been right nephrectomy.\par 2. New 0.8 cm enhancing mass in the periampullary portion of the duodenum. Recommend endoscopy to evaluate for possible metastatic or primary neoplasm versus bulging papilla.\par 3. New consolidation surrounding calcific densities in anterior segment of right upper lobe. The calcific densities could represent broncholiths versus aspirated material if patient had been prone at the time. There could be surrounding infection. Recommend bronchoscopy. [de-identified] : He presents to clinic for f/u with pembro. Reports frequent urination w/o any pain.  Denies rash, itchiness, chills, fevers, decreased appetite, N/V/C/D, chest pain, or abdominal pain.

## 2022-08-04 ENCOUNTER — APPOINTMENT (OUTPATIENT)
Age: 67
End: 2022-08-04

## 2022-08-04 ENCOUNTER — RESULT REVIEW (OUTPATIENT)
Age: 67
End: 2022-08-04

## 2022-08-04 PROCEDURE — 43239 EGD BIOPSY SINGLE/MULTIPLE: CPT

## 2022-08-04 PROCEDURE — 43237 ENDOSCOPIC US EXAM ESOPH: CPT | Mod: 59

## 2022-08-17 ENCOUNTER — APPOINTMENT (OUTPATIENT)
Dept: UROLOGY | Facility: CLINIC | Age: 67
End: 2022-08-17

## 2022-08-17 VITALS — SYSTOLIC BLOOD PRESSURE: 125 MMHG | TEMPERATURE: 97.2 F | DIASTOLIC BLOOD PRESSURE: 77 MMHG | HEART RATE: 75 BPM

## 2022-08-17 PROCEDURE — 99214 OFFICE O/P EST MOD 30 MIN: CPT

## 2022-08-18 NOTE — ASSESSMENT
[FreeTextEntry1] : 65 yo male with 10cm right renal mass and IVC thrombus. \par s/p radical nephrectomy 11/2/21\par pT3b, ccRCC, grade 2, negative margins\par Currently undergoing adjuvant pembro\par Reviewed CT C/A/P 6/2022, questionable duodenal nodule which was negative on endoscopic biopsy\par Doing well \par Imaging in 12/2022 with Med Onc. f/u after imaging

## 2022-08-18 NOTE — HISTORY OF PRESENT ILLNESS
[None] : None [FreeTextEntry1] : 67 yo La[place] male with PMHx HTN presents for 10cm right renal mass. First discovered on abdominal US which he reports was ordered by his primary for health maintenance-he has no complaints. Previous abdominal surgery appendectomy. He has no family hx of  malignancies. He is a former smoker (8 pack year hx). Denies hematuria, CP, SOB, fevers, unexplained weight loss.\par \par Today he feels well overall except for a mild headache but he had 2 teeth removed last week and SHAHID has been relieved by advil.\par \par CT w/ and w/o at [facility] on 10/12/21: 10.1 x 7.4 x 7.4cm heterogeneous enhancing partially exophytic mass mid to lower pole of right kidney with extensive neovascularity and extension to the renal pelvis at the lower pole. No hydronephrosis. Filling defect within right main renal vein extending into infrahepatic IVC consistent with thrombosis. Few associated collateral vessels noted in right renal fossa region. No LAD.\par \par 11/10/21 Here for postop visit. Underwent right radical nephrectomy with caval thrombus 11/2. Path pending. Discharged home POD#2. Doing well. \par \par 2/16/22 Here for f/u. Undergoing adjuvant pempro with Dr. Gage. Doing well, tolerating treatment well. \par Recent labs reviewed\par Cr 1.6\par Alk phos 88\par normal LFTs\par normal CBC\par \par 5/16/22 Here for f/u. Undergoing adjuvant pempro with Dr. Gage. \par Labs reviewed today\par Cr 1.7\par Alk phos 102\par Ca 8.8\par Normal LFTs\par normal CBC\par \par 8/17/22 Here for f/u. Underwent CT C/A/P 6/30/22. There was a questionable duodenal nodule. He underwent endoscopy with biopsy which was negative for metastatic disease. He is feeling well, continues on adjuvant pembro with Dr. Ulrich.

## 2022-08-18 NOTE — LETTER BODY
[Dear  ___] : Dear  [unfilled], [Courtesy Letter:] : I had the pleasure of seeing your patient, [unfilled], in my office today. [Please see my note below.] : Please see my note below. [Consult Closing:] : Thank you very much for allowing me to participate in the care of this patient.  If you have any questions, please do not hesitate to contact me. [Sincerely,] : Sincerely, [FreeTextEntry2] : Shirin Mills MD\par [address], [address]\[place] [address]  [FreeTextEntry3] : Jaden Garrett MD, FACS\par Urologic Oncology\par Department of Urology\par [facility] [place] [facility]\par \par [employer] [facility] \par \par

## 2022-08-18 NOTE — REVIEW OF SYSTEMS
[Negative] : Psychiatric [Fever] : no fever [Chills] : no chills [Feeling Poorly] : not feeling poorly [Feeling Tired] : not feeling tired [Shortness Of Breath] : no shortness of breath [Abdominal Pain] : no abdominal pain [Constipation] : no constipation

## 2022-09-06 ENCOUNTER — APPOINTMENT (OUTPATIENT)
Dept: INFUSION THERAPY | Facility: CLINIC | Age: 67
End: 2022-09-06

## 2022-09-06 ENCOUNTER — LABORATORY RESULT (OUTPATIENT)
Age: 67
End: 2022-09-06

## 2022-09-06 ENCOUNTER — OUTPATIENT (OUTPATIENT)
Dept: OUTPATIENT SERVICES | Facility: HOSPITAL | Age: 67
LOS: 1 days | End: 2022-09-06
Payer: MEDICARE

## 2022-09-06 ENCOUNTER — APPOINTMENT (OUTPATIENT)
Dept: HEMATOLOGY ONCOLOGY | Facility: CLINIC | Age: 67
End: 2022-09-06

## 2022-09-06 VITALS
RESPIRATION RATE: 18 BRPM | WEIGHT: 153 LBS | OXYGEN SATURATION: 96 % | HEART RATE: 91 BPM | DIASTOLIC BLOOD PRESSURE: 85 MMHG | HEIGHT: 68 IN | TEMPERATURE: 98 F | SYSTOLIC BLOOD PRESSURE: 131 MMHG

## 2022-09-06 VITALS
RESPIRATION RATE: 18 BRPM | TEMPERATURE: 97.8 F | HEART RATE: 91 BPM | OXYGEN SATURATION: 96 % | DIASTOLIC BLOOD PRESSURE: 85 MMHG | SYSTOLIC BLOOD PRESSURE: 131 MMHG | HEIGHT: 68 IN

## 2022-09-06 DIAGNOSIS — Z13.29 ENCOUNTER FOR SCREENING FOR OTHER SUSPECTED ENDOCRINE DISORDER: ICD-10-CM

## 2022-09-06 DIAGNOSIS — Z98.890 OTHER SPECIFIED POSTPROCEDURAL STATES: Chronic | ICD-10-CM

## 2022-09-06 DIAGNOSIS — Z90.49 ACQUIRED ABSENCE OF OTHER SPECIFIED PARTS OF DIGESTIVE TRACT: Chronic | ICD-10-CM

## 2022-09-06 DIAGNOSIS — C64.2 MALIGNANT NEOPLASM OF LEFT KIDNEY, EXCEPT RENAL PELVIS: ICD-10-CM

## 2022-09-06 DIAGNOSIS — Z98.49 CATARACT EXTRACTION STATUS, UNSPECIFIED EYE: Chronic | ICD-10-CM

## 2022-09-06 DIAGNOSIS — K31.89 OTHER DISEASES OF STOMACH AND DUODENUM: ICD-10-CM

## 2022-09-06 DIAGNOSIS — K29.70 GASTRITIS, UNSPECIFIED, W/OUT BLEEDING: ICD-10-CM

## 2022-09-06 LAB
ALBUMIN SERPL ELPH-MCNC: 3.7 G/DL
ALP BLD-CCNC: 98 U/L
ALT SERPL-CCNC: 16 U/L
ANION GAP SERPL CALC-SCNC: 2 MMOL/L
AST SERPL-CCNC: 26 U/L
BILIRUB SERPL-MCNC: 0.7 MG/DL
BUN SERPL-MCNC: 21 MG/DL
CALCIUM SERPL-MCNC: 9.5 MG/DL
CHLORIDE SERPL-SCNC: 108 MMOL/L
CO2 SERPL-SCNC: 24 MMOL/L
CREAT SERPL-MCNC: 1.6 MG/DL
EGFR: 47 ML/MIN/1.73M2
GLUCOSE SERPL-MCNC: 112 MG/DL
POTASSIUM SERPL-SCNC: 4.3 MMOL/L
PROT SERPL-MCNC: 7 G/DL
SODIUM SERPL-SCNC: 134 MMOL/L

## 2022-09-06 PROCEDURE — 99214 OFFICE O/P EST MOD 30 MIN: CPT | Mod: 25

## 2022-09-06 PROCEDURE — 36415 COLL VENOUS BLD VENIPUNCTURE: CPT

## 2022-09-06 PROCEDURE — 96413 CHEMO IV INFUSION 1 HR: CPT

## 2022-09-06 RX ORDER — PEMBROLIZUMAB 25 MG/ML
400 INJECTION, SOLUTION INTRAVENOUS ONCE
Refills: 0 | Status: COMPLETED | OUTPATIENT
Start: 2022-09-06 | End: 2022-09-06

## 2022-09-06 RX ADMIN — PEMBROLIZUMAB 400 MILLIGRAM(S): 25 INJECTION, SOLUTION INTRAVENOUS at 15:30

## 2022-09-06 RX ADMIN — PEMBROLIZUMAB 400 MILLIGRAM(S): 25 INJECTION, SOLUTION INTRAVENOUS at 14:59

## 2022-09-06 NOTE — HISTORY OF PRESENT ILLNESS
[de-identified] : Mr. 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 Garrett. Started C1 Pembro 200mg(q3 wks) on 12/7/21, C2 Pembro 400mg(q6 wks) on 12/28/21, C3 pembro 200mg on 2/8/22.  C4 Pembro was held d/t  decreased TSH level. He visited to [facility] in March and came back to [place] now. Resumed C4 pembrolizumab 200mg on 4/12/22. Developed hypothyroidism  now on levothyroxine 88mcg with Dr. Lockhart. s/p C8 pembrolizumab on 7/6/22. Received C9 pembrolizumab 400mg ( 6 week dose) on 7/26/22. Today here for f/u with C10 pembrolizumab(400mg every 6 weeks)\par \par ONC Hx:\par Mr. Mills initially was seen by Dr. Brady on 10/18/21 for evaluation of renal mass. His PCP ordered US abdomen for health maintenance and he was found to have 10cm right renal mass.  He does not have any symptoms.  He had CT Kidney performed on 10/12/21 which showed 10.1 x 7.4 x 7.4cm heterogeneous enhancing partially exophytic mass mid to lower pole of right kidney with extensive neovascularity and extension to the renal pelvis at the lower pole,  infrahepatic IVC thrombosis.  He was then referred to Dr. Garrett on 10/20/21 who ordered CT chest and MRI abdomen.  CT Chest on 10/28/21 showed  no suspicious pulmonary finding, MRI abdomen on 10/28/21 with renal mass in right lower pole involving renal pelvis suspicious for renal cell carcinoma, 8.2 cm,  tumor thrombus within right renal vein with extension into infra hepatic IVC. He underwent right radical nephrectomy with  caval thrombus on 11/2/21, pathology showed [id number] Clear cell renal cell carcinoma. \par \par 10/12/21 CT kidney w/ and w/o at [facility]\par -10.1 x 7.4 x 7.4cm heterogeneous enhancing partially exophytic mass mid to lower pole of right kidney with extensive neovascularity and extension to the renal pelvis at the lower pole. No hydronephrosis. Filling defect within right main renal vein extending into infrahepatic IVC consistent with thrombosis. Few associated collateral vessels noted in right renal fossa region. No LAD.  \par \par 10/21/21 Urine Cytology\par NEGATIVE FOR HIGH GRADE UROTHELIAL CARCINOMA\par Squamous cells, urothelial cells, red blood cells, and degenerated cells.\par \par 10/28/21 CT Chest\par No suspicious pulmonary finding.\par Persistent thrombus within the right renal vein extending into the IVC.\par \par 10/28/21 MRI Abdomen\par 1. Renal mass in right lower pole involving renal pelvis suspicious for renal cell carcinoma, 8.2 cm.\par 2.Tumor thrombus within right renal vein with extension into infra hepatic IVC.\par \par 11/2/21\par Surgical Pathology Report\par Final Diagnosis\par Kidney, right; radical nephrectomy:\par - Clear cell renal cell carcinoma, Grade 3 - See Note and Synoptic\par  - Carcinoma thrombus involving the renal vein.\par - Carcinoma infiltrates perinephric renal fat.\par - Margins negative for carcinoma.\par \par Synoptic Summary\par KIDNEY: Nephrectomy\par SPECIMEN\par Procedure:  Radical nephrectomy\par Specimen Laterality:  Right\par TUMOR\par Histologic Type:  Clear cell renal cell carcinoma\par Histologic Grade:  G3: Nucleoli conspicuous and eosinophilic at 100x\par magnification\par Tumor Size: Greatest Dimension (Centimeters) -   7.5 cm\par Tumor Focality:  Unifocal\par Tumor Extension:  Tumor extension into perinephric tissue (beyond renal capsule), Tumor extension into major vein (renal vein or its segmental branches, inferior vena cava)\par Sarcomatoid Features:  Not identified\par Rhabdoid Features:  Not identified\par Tumor Necrosis:  Present\par Lymphovascular Invasion:  Present\par MARGINS\par Margins: Uninvolved by invasive carcinoma on this specimen (Clinically, tumor thrombus involving inferior vena cava removed at time of surgery)\par LYMPH NODES\par Regional Lymph Nodes:  No lymph nodes submitted or found\par Primary Tumor (pT):  pT3b\par Regional Lymph Nodes (pN):  pNX\par ADDITIONAL FINDINGS\par Pathologic Findings in Nonneoplastic Kidney:   None identified\par \par PD-L1 IMMUNOHISTOCHEMICAL ANALYSIS:\par Tumor Proportion Score: 2-5% / Positive\par \par 6/30/22 CT CAP\par 1. Since 10/28/2021, there has been right nephrectomy.\par 2. New 0.8 cm enhancing mass in the periampullary portion of the duodenum. Recommend endoscopy to evaluate for possible metastatic or primary neoplasm versus bulging papilla.\par 3. New consolidation surrounding calcific densities in anterior segment of right upper lobe. The calcific densities could represent broncholiths versus aspirated material if patient had been prone at the time. There could be surrounding infection. Recommend bronchoscopy. [de-identified] : He presents to clinic for f/u with pembro. He reports occasional sneezing with phlegm. Otherwise he feels fine. Denies rash, itchiness, chills, fever, N/V/C/D, chest pain, or abdominal pain.  since last visit had EGD which identified gastritis but he has no symptoms of this.

## 2022-09-06 NOTE — ASSESSMENT
[FreeTextEntry1] : Mr. 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 Garrett. Started C1 Pembro 200mg(q3 wks) on 12/7/21, C2 Pembro 400mg(q6 wks) on 12/28/21, C3 pembro 200mg on 2/8/22. C4 Pembro was held d/t  decreased TSH level. He visited  to [facility] in March and came back to [place] now. Resumed C4 pembrolizumab 200mg on 4/12/22. Developed hypothyroidism  now on levothyroxine 88mcg with Dr. Lockhart, s/p C8 pembrolizumab on 7/6/22. Received C9 pembrolizumab 400mg ( 6 week dose) on 7/26/22. Today here for f/u with C10 pembrolizumab(400mg every 6 weeks)\par \par Plan\par # Stage III  [id number], PD-L1  2-5% / Positive  G3 Renal cell carcinoma with IVC thrombosis, s/p Rt radical nephrectomy\par --receiving treatment with adjuvant Pembrolizumab x 1 year \par --Started C1 Pembro on 12/7/21. d/t trip to [place] in January, pembro was increased to 400mg Q 6 weeks with C2 on 12/28/21. Tolerating tx well w/o new symptoms.  Received C3 Pembro 200mg on 2/8/22. C4 Pembro was held d/t  decreased TSH level. Further work up delayed d/t trip to [place] in March.  Resumed C4 pembrolizumab 200mg on 4/12/22. Developed hypothyroidism  now on levothyroxine 88mcg with Dr. Lockhart, s/p C8  pembrolizumab(200mg) on 7/5/22.  Received C9 pembrolizumab ( 400mg 6 week dose per FDA label) on 7/26/22. Current plan is to complete 1 year of treatment (12/2022) as long as the patient proceeds w/o any treatment-limiting immune-mediated toxicities. Tolerating treatment well.  Will proceed with C10 today \par --repeat surveillance scans after another 6 month interval (12/2022) barring problems in the meantime\par \par #Hypothyroidism/Hashimoto's  2/2 IO therapy\par -ct levothyroxine 88mcg and f/u with Dr. Lockhart\par -  repeat TSH pending today\par \par # duodenal nodule\par --s/p EGD + EUS with Dr. Bell on 8/4/22.  Per Dr Bell the duodenal papilla was "prominent" but no other findings to account for the duodenal nodule seen on the CT scan.  Inspection of the stomach did show findings of gastritis, confirmed on biopsy, H pylori and CMV/HSV negative\par --recommend treatment with PPI x 14 days for gastritis\par \par # CKD - stable \par --monitoring creatinine prior to each pembrolizumab treatment.\par \par RTC with C11 on 10/19/22\par Labs with CBC, CMP, TSH/FT4\par \par pt was seen with STEPHEN Lockhart who served as Swedish  for the visit\par \par \par

## 2022-09-06 NOTE — REVIEW OF SYSTEMS
[Negative] : Allergic/Immunologic [Fever] : no fever [Chills] : no chills [Night Sweats] : no night sweats [Fatigue] : no fatigue [Recent Change In Weight] : ~T no recent weight change [Shortness Of Breath] : no shortness of breath [Wheezing] : no wheezing [Cough] : no cough [Dysuria] : no dysuria [Incontinence] : no incontinence [Skin Rash] : no skin rash [Dizziness] : no dizziness [FreeTextEntry6] : sneezing with phlegm

## 2022-09-07 LAB — TSH SERPL-ACNC: 0.19 UIU/ML

## 2022-09-29 ENCOUNTER — APPOINTMENT (OUTPATIENT)
Dept: ENDOCRINOLOGY | Facility: CLINIC | Age: 67
End: 2022-09-29

## 2022-09-29 VITALS
SYSTOLIC BLOOD PRESSURE: 122 MMHG | TEMPERATURE: 97.6 F | OXYGEN SATURATION: 94 % | WEIGHT: 150 LBS | HEART RATE: 80 BPM | DIASTOLIC BLOOD PRESSURE: 79 MMHG | HEIGHT: 68 IN | BODY MASS INDEX: 22.73 KG/M2

## 2022-09-29 PROCEDURE — 99214 OFFICE O/P EST MOD 30 MIN: CPT

## 2022-09-29 NOTE — HISTORY OF PRESENT ILLNESS
[FreeTextEntry1] : Patient is a 67 yo man, former smoker, HTN with recently diagnosed kidney cancer. He is here today for hypothyroidism\par \par Patient with renal mass in 2021. He had right radical nephrectomy. He is on RCT with pembrolizumab. Reports some cold intolerance two weeks ago. No change in energy levels, intermittent constipation. TSH April was 49 and started on levothyroxine 88 mcg. The cold intolerance has improved.  Patient reports cough and sneeze 5 weeks ago which resolved on its own. He does have a history of allergies and it may have been due to that.  \par No family hx of thyroid disease\par Appetite is good right now. \par Appetite is stable and bowel movements are regualr\par No exposures to lithium/amiodarone/radiation\par Quite smoking 1 year ago\par Occasional alcohol\par Was taking BP medicines but was told to stop when BP was low. Now it remain high normal\par States diet is healthy: rice mixed with beans, tries to avoid noodles; has reduced red meat and will have more pork and fish

## 2022-09-29 NOTE — ASSESSMENT
[FreeTextEntry1] : Patient is a 67 yo man with hypothyroidism and prediabetes here for follow up\par \par 1. Hypothyroid\par -thyroid dysfunction is common in patients treated with pembrolizumab as it can cause destructive thyroiditis and overt hypothyroidism\par -he developed chemical hypothyroidism when TSH level was checked April 12, 2022. TSH was 49 with a free T4 of 0.541. He was started on Levothyroxine 88 mcg po daily April 2022\par -patient did experiences symptoms including cold intolerance and fatigue which he believes has improved since being on medicines\par -reports fatigue but clinically euthyroid with normal bowel movements, normal appetite/weight\par -adheres to LT4 88 mcg po daily\par -TSH three weeks ago was a little bit low with normal free T4 levels.  It is too early to check now, he can repeat TSH levels when he goes for follow up in 2 weeks.  The tests were ordered. Will wait for those results to make adjustments to medicines.  TSH is being checked by heme/onc every two months but note that TSH take 3-4 months to equilibrate and increased lab draws are not necessary unless there are symptom changes\par -encourage healthy diet\par \par 2. Prediabetes\par -repeat A1c with next lab draw\par -encourage healthy, well rounded diet.  Decrease sugars, incorporate healthy/complex carbs\par \par Follow up in 3-4 months

## 2022-09-29 NOTE — REVIEW OF SYSTEMS
[Fatigue] : fatigue [Decreased Appetite] : appetite not decreased [Dysphagia] : no dysphagia [Dysphonia] : no dysphonia [Chest Pain] : no chest pain [Palpitations] : no palpitations [Shortness Of Breath] : no shortness of breath [Nausea] : no nausea [Vomiting] : no vomiting [Headaches] : no headaches [Tremors] : no tremors

## 2022-10-18 ENCOUNTER — APPOINTMENT (OUTPATIENT)
Dept: INFUSION THERAPY | Facility: CLINIC | Age: 67
End: 2022-10-18

## 2022-10-19 ENCOUNTER — OUTPATIENT (OUTPATIENT)
Dept: OUTPATIENT SERVICES | Facility: HOSPITAL | Age: 67
LOS: 1 days | End: 2022-10-19
Payer: MEDICARE

## 2022-10-19 ENCOUNTER — LABORATORY RESULT (OUTPATIENT)
Age: 67
End: 2022-10-19

## 2022-10-19 ENCOUNTER — APPOINTMENT (OUTPATIENT)
Dept: INFUSION THERAPY | Facility: CLINIC | Age: 67
End: 2022-10-19

## 2022-10-19 ENCOUNTER — APPOINTMENT (OUTPATIENT)
Dept: HEMATOLOGY ONCOLOGY | Facility: CLINIC | Age: 67
End: 2022-10-19

## 2022-10-19 VITALS
OXYGEN SATURATION: 93 % | DIASTOLIC BLOOD PRESSURE: 87 MMHG | RESPIRATION RATE: 18 BRPM | WEIGHT: 152 LBS | HEART RATE: 86 BPM | SYSTOLIC BLOOD PRESSURE: 138 MMHG | BODY MASS INDEX: 23.04 KG/M2 | HEIGHT: 68 IN | TEMPERATURE: 97.5 F

## 2022-10-19 VITALS
HEART RATE: 86 BPM | OXYGEN SATURATION: 97 % | DIASTOLIC BLOOD PRESSURE: 87 MMHG | RESPIRATION RATE: 18 BRPM | SYSTOLIC BLOOD PRESSURE: 138 MMHG | WEIGHT: 151.9 LBS | TEMPERATURE: 98 F | HEIGHT: 68 IN

## 2022-10-19 DIAGNOSIS — C64.2 MALIGNANT NEOPLASM OF LEFT KIDNEY, EXCEPT RENAL PELVIS: ICD-10-CM

## 2022-10-19 DIAGNOSIS — Z98.49 CATARACT EXTRACTION STATUS, UNSPECIFIED EYE: Chronic | ICD-10-CM

## 2022-10-19 DIAGNOSIS — Z90.49 ACQUIRED ABSENCE OF OTHER SPECIFIED PARTS OF DIGESTIVE TRACT: Chronic | ICD-10-CM

## 2022-10-19 DIAGNOSIS — Z98.890 OTHER SPECIFIED POSTPROCEDURAL STATES: Chronic | ICD-10-CM

## 2022-10-19 LAB
ALBUMIN SERPL ELPH-MCNC: 3.9 G/DL
ALP BLD-CCNC: 100 U/L
ALT SERPL-CCNC: 19 U/L
ANION GAP SERPL CALC-SCNC: 9 MMOL/L
AST SERPL-CCNC: 34 U/L
BILIRUB SERPL-MCNC: 0.8 MG/DL
BUN SERPL-MCNC: 18 MG/DL
CALCIUM SERPL-MCNC: 9.4 MG/DL
CHLORIDE SERPL-SCNC: 110 MMOL/L
CO2 SERPL-SCNC: 25 MMOL/L
CREAT SERPL-MCNC: 1.8 MG/DL
EGFR: 41 ML/MIN/1.73M2
GLUCOSE SERPL-MCNC: 132 MG/DL
POTASSIUM SERPL-SCNC: 4.6 MMOL/L
PROT SERPL-MCNC: 7 G/DL
SODIUM SERPL-SCNC: 144 MMOL/L

## 2022-10-19 PROCEDURE — 99214 OFFICE O/P EST MOD 30 MIN: CPT | Mod: 25

## 2022-10-19 PROCEDURE — 36415 COLL VENOUS BLD VENIPUNCTURE: CPT

## 2022-10-19 PROCEDURE — 96413 CHEMO IV INFUSION 1 HR: CPT

## 2022-10-19 RX ORDER — PEMBROLIZUMAB 25 MG/ML
400 INJECTION, SOLUTION INTRAVENOUS ONCE
Refills: 0 | Status: COMPLETED | OUTPATIENT
Start: 2022-10-19 | End: 2022-10-19

## 2022-10-19 RX ORDER — OMEPRAZOLE 40 MG/1
40 CAPSULE, DELAYED RELEASE ORAL DAILY
Qty: 14 | Refills: 0 | Status: DISCONTINUED | COMMUNITY
Start: 2022-09-06 | End: 2022-10-19

## 2022-10-19 RX ORDER — PEMBROLIZUMAB 25 MG/ML
400 INJECTION, SOLUTION INTRAVENOUS ONCE
Refills: 0 | Status: COMPLETED | OUTPATIENT
Start: 2022-11-30 | End: 2022-11-30

## 2022-10-19 RX ADMIN — PEMBROLIZUMAB 400 MILLIGRAM(S): 25 INJECTION, SOLUTION INTRAVENOUS at 15:15

## 2022-10-19 RX ADMIN — PEMBROLIZUMAB 400 MILLIGRAM(S): 25 INJECTION, SOLUTION INTRAVENOUS at 14:43

## 2022-10-19 NOTE — ASSESSMENT
[FreeTextEntry1] : Mr. 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 Garrett. Started C1 Pembro 200mg(q3 wks) on 12/7/21, C2 Pembro 400mg(q6 wks) on 12/28/21, C3 pembro 200mg on 2/8/22. C4 Pembro was held d/t  decreased TSH level. He visited  to [facility] in March and came back to [place] now. Resumed C4 pembrolizumab 200mg on 4/12/22. Developed hypothyroidism  now on levothyroxine 88mcg with Dr. Lockhart, s/p C8 pembrolizumab on 7/6/22. Received C9 pembrolizumab 400mg (6 week dose) on 7/26/22. s/p C10 on 9/6/22 Today here for f/u with C11 pembrolizumab (400mg every 6 weeks). \par \par Plan\par # Stage III  [id number], PD-L1  2-5% / Positive  G3 Renal cell carcinoma with IVC thrombosis, s/p Rt radical nephrectomy\par --receiving treatment with adjuvant Pembrolizumab x 1 year.  Last planned treatment will be on 11/30/22\par --Started C1 Pembro on 12/7/21. d/t trip to [place] in January, pembro was increased to 400mg Q 6 weeks with C2 on 12/28/21. Tolerating tx well w/o new symptoms.  Received C3 Pembro 200mg on 2/8/22. C4 Pembro was held d/t  decreased TSH level. Further work up delayed d/t trip to [place] in March.  Resumed C4 pembrolizumab 200mg on 4/12/22. Developed hypothyroidism  now on levothyroxine 88mcg with Dr. Lockhart, s/p C8  pembrolizumab(200mg) on 7/5/22.  Received C9 pembrolizumab ( 400mg 6 week dose per FDA label) on 7/26/22. Current plan is to complete 1 year of treatment (last 11/30/2022) as long as the patient proceeds w/o any treatment-limiting immune-mediated toxicities. Tolerating treatment well.  Labs reviewed and will proceed with C11 today \par --Will repeat surveillance scans prior to the last treatment for 11/30/22.  CT c/a/p ordered.\par \par #Hypothyroidism/Hashimoto's  2/2 IO therapy\par -ct levothyroxine 88mcg and f/u with Dr. Lockhart\par - repeat TSH pending today\par \par # duodenal nodule\par --s/p EGD + EUS with Dr. Bell on 8/4/22.  Per Dr Bell the duodenal papilla was "prominent" but no other findings to account for the duodenal nodule seen on the CT scan.  Inspection of the stomach did show findings of gastritis, confirmed on biopsy, H pylori and CMV/HSV negative\par --Completed PPI x 14 days for gastritis\par \par # CKD - stable \par --monitoring creatinine prior to each pembrolizumab treatment.\par \par RTC with C12 on 11/30/22 after CT C/A/P \par Labs with CBC, CMP, TSH/FT4\par \par pt was seen with STEPHEN Lockhart who served as Welsh  for the visit\par \par \par

## 2022-10-19 NOTE — REVIEW OF SYSTEMS
[Negative] : Heme/Lymph [Fatigue] : fatigue [Joint Pain] : joint pain [Muscle Pain] : muscle pain [Fever] : no fever [Chills] : no chills [Night Sweats] : no night sweats [Recent Change In Weight] : ~T no recent weight change [Shortness Of Breath] : no shortness of breath [Wheezing] : no wheezing [Cough] : no cough [SOB on Exertion] : no shortness of breath during exertion [Dysuria] : no dysuria [Incontinence] : no incontinence [Joint Stiffness] : no joint stiffness [Muscle Weakness] : no muscle weakness [Skin Rash] : no skin rash [Dizziness] : no dizziness [FreeTextEntry9] : c/c right shoulder pain

## 2022-10-19 NOTE — HISTORY OF PRESENT ILLNESS
[de-identified] : Mr. 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 Garrett. Started C1 Pembro 200mg (q3 wks) on 12/7/21, C2 Pembro 400mg (q6 wks) on 12/28/21, C3 pembro 200mg on 2/8/22.  C4 Pembro was held d/t  decreased TSH level. He visited to [facility] in March and came back to [place] now. Resumed C4 pembrolizumab 200mg on 4/12/22. Developed hypothyroidism  now on levothyroxine 88mcg with Dr. Lockhart. s/p C8 pembrolizumab on 7/6/22. Received C9 pembrolizumab 400mg (6 week dose) on 7/26/22. s/p C10 on 9/6/22 Today here for f/u with C11 pembrolizumab (400mg every 6 weeks). \par \par ONC Hx:\par Mr. Mills initially was seen by Dr. Brady on 10/18/21 for evaluation of renal mass. His PCP ordered US abdomen for health maintenance and he was found to have 10cm right renal mass.  He does not have any symptoms.  He had CT Kidney performed on 10/12/21 which showed 10.1 x 7.4 x 7.4cm heterogeneous enhancing partially exophytic mass mid to lower pole of right kidney with extensive neovascularity and extension to the renal pelvis at the lower pole,  infrahepatic IVC thrombosis.  He was then referred to Dr. Garrett on 10/20/21 who ordered CT chest and MRI abdomen.  CT Chest on 10/28/21 showed  no suspicious pulmonary finding, MRI abdomen on 10/28/21 with renal mass in right lower pole involving renal pelvis suspicious for renal cell carcinoma, 8.2 cm,  tumor thrombus within right renal vein with extension into infra hepatic IVC. He underwent right radical nephrectomy with  caval thrombus on 11/2/21, pathology showed [id number] Clear cell renal cell carcinoma. \par \par 10/12/21 CT kidney w/ and w/o at [facility]\par -10.1 x 7.4 x 7.4cm heterogeneous enhancing partially exophytic mass mid to lower pole of right kidney with extensive neovascularity and extension to the renal pelvis at the lower pole. No hydronephrosis. Filling defect within right main renal vein extending into infrahepatic IVC consistent with thrombosis. Few associated collateral vessels noted in right renal fossa region. No LAD.  \par \par 10/21/21 Urine Cytology\par NEGATIVE FOR HIGH GRADE UROTHELIAL CARCINOMA\par Squamous cells, urothelial cells, red blood cells, and degenerated cells.\par \par 10/28/21 CT Chest\par No suspicious pulmonary finding.\par Persistent thrombus within the right renal vein extending into the IVC.\par \par 10/28/21 MRI Abdomen\par 1. Renal mass in right lower pole involving renal pelvis suspicious for renal cell carcinoma, 8.2 cm.\par 2.Tumor thrombus within right renal vein with extension into infra hepatic IVC.\par \par 11/2/21\par Surgical Pathology Report\par Final Diagnosis\par Kidney, right; radical nephrectomy:\par - Clear cell renal cell carcinoma, Grade 3 - See Note and Synoptic\par  - Carcinoma thrombus involving the renal vein.\par - Carcinoma infiltrates perinephric renal fat.\par - Margins negative for carcinoma.\par \par Synoptic Summary\par KIDNEY: Nephrectomy\par SPECIMEN\par Procedure:  Radical nephrectomy\par Specimen Laterality:  Right\par TUMOR\par Histologic Type:  Clear cell renal cell carcinoma\par Histologic Grade:  G3: Nucleoli conspicuous and eosinophilic at 100x\par magnification\par Tumor Size: Greatest Dimension (Centimeters) -   7.5 cm\par Tumor Focality:  Unifocal\par Tumor Extension:  Tumor extension into perinephric tissue (beyond renal capsule), Tumor extension into major vein (renal vein or its segmental branches, inferior vena cava)\par Sarcomatoid Features:  Not identified\par Rhabdoid Features:  Not identified\par Tumor Necrosis:  Present\par Lymphovascular Invasion:  Present\par MARGINS\par Margins: Uninvolved by invasive carcinoma on this specimen (Clinically, tumor thrombus involving inferior vena cava removed at time of surgery)\par LYMPH NODES\par Regional Lymph Nodes:  No lymph nodes submitted or found\par Primary Tumor (pT):  pT3b\par Regional Lymph Nodes (pN):  pNX\par ADDITIONAL FINDINGS\par Pathologic Findings in Nonneoplastic Kidney:   None identified\par \par PD-L1 IMMUNOHISTOCHEMICAL ANALYSIS:\par Tumor Proportion Score: 2-5% / Positive\par \par 6/30/22 CT CAP\par 1. Since 10/28/2021, there has been right nephrectomy.\par 2. New 0.8 cm enhancing mass in the periampullary portion of the duodenum. Recommend endoscopy to evaluate for possible metastatic or primary neoplasm versus bulging papilla.\par 3. New consolidation surrounding calcific densities in anterior segment of right upper lobe. The calcific densities could represent broncholiths versus aspirated material if patient had been prone at the time. There could be surrounding infection. Recommend bronchoscopy.\par \par  [de-identified] : He presents to clinic for f/u with pembro. He reports mild fatigue and intermittent right shoulder pain. Otherwise he feels fine. Denies rash, itchiness, chills, fever, N/V/C/D, chest pain, or abdominal pain.

## 2022-10-20 LAB — TSH SERPL-ACNC: 1.25 UIU/ML

## 2022-10-24 ENCOUNTER — RESULT REVIEW (OUTPATIENT)
Age: 67
End: 2022-10-24

## 2022-11-21 ENCOUNTER — NON-APPOINTMENT (OUTPATIENT)
Age: 67
End: 2022-11-21

## 2022-11-21 ENCOUNTER — OUTPATIENT (OUTPATIENT)
Dept: OUTPATIENT SERVICES | Facility: HOSPITAL | Age: 67
LOS: 1 days | End: 2022-11-21
Payer: MEDICARE

## 2022-11-21 ENCOUNTER — APPOINTMENT (OUTPATIENT)
Dept: CT IMAGING | Facility: HOSPITAL | Age: 67
End: 2022-11-21

## 2022-11-21 ENCOUNTER — RESULT REVIEW (OUTPATIENT)
Age: 67
End: 2022-11-21

## 2022-11-21 DIAGNOSIS — Z90.49 ACQUIRED ABSENCE OF OTHER SPECIFIED PARTS OF DIGESTIVE TRACT: Chronic | ICD-10-CM

## 2022-11-21 DIAGNOSIS — Z98.890 OTHER SPECIFIED POSTPROCEDURAL STATES: Chronic | ICD-10-CM

## 2022-11-21 DIAGNOSIS — Z98.49 CATARACT EXTRACTION STATUS, UNSPECIFIED EYE: Chronic | ICD-10-CM

## 2022-11-21 LAB — POCT ISTAT CREATININE: 1.5 MG/DL — HIGH (ref 0.5–1.3)

## 2022-11-21 PROCEDURE — 82565 ASSAY OF CREATININE: CPT

## 2022-11-21 PROCEDURE — 71260 CT THORAX DX C+: CPT

## 2022-11-21 PROCEDURE — 74177 CT ABD & PELVIS W/CONTRAST: CPT

## 2022-11-21 PROCEDURE — 71260 CT THORAX DX C+: CPT | Mod: 26

## 2022-11-21 PROCEDURE — 74177 CT ABD & PELVIS W/CONTRAST: CPT | Mod: 26

## 2022-11-30 ENCOUNTER — LABORATORY RESULT (OUTPATIENT)
Age: 67
End: 2022-11-30

## 2022-11-30 ENCOUNTER — APPOINTMENT (OUTPATIENT)
Dept: INFUSION THERAPY | Facility: CLINIC | Age: 67
End: 2022-11-30

## 2022-11-30 ENCOUNTER — APPOINTMENT (OUTPATIENT)
Dept: HEMATOLOGY ONCOLOGY | Facility: CLINIC | Age: 67
End: 2022-11-30

## 2022-11-30 ENCOUNTER — OUTPATIENT (OUTPATIENT)
Dept: OUTPATIENT SERVICES | Facility: HOSPITAL | Age: 67
LOS: 1 days | End: 2022-11-30
Payer: MEDICARE

## 2022-11-30 VITALS
OXYGEN SATURATION: 99 % | SYSTOLIC BLOOD PRESSURE: 124 MMHG | RESPIRATION RATE: 18 BRPM | HEART RATE: 78 BPM | TEMPERATURE: 98 F | DIASTOLIC BLOOD PRESSURE: 78 MMHG

## 2022-11-30 VITALS
RESPIRATION RATE: 18 BRPM | TEMPERATURE: 97.1 F | DIASTOLIC BLOOD PRESSURE: 84 MMHG | HEART RATE: 87 BPM | WEIGHT: 150 LBS | OXYGEN SATURATION: 94 % | BODY MASS INDEX: 22.73 KG/M2 | HEIGHT: 68 IN | SYSTOLIC BLOOD PRESSURE: 136 MMHG

## 2022-11-30 DIAGNOSIS — Z90.49 ACQUIRED ABSENCE OF OTHER SPECIFIED PARTS OF DIGESTIVE TRACT: Chronic | ICD-10-CM

## 2022-11-30 DIAGNOSIS — Z98.49 CATARACT EXTRACTION STATUS, UNSPECIFIED EYE: Chronic | ICD-10-CM

## 2022-11-30 DIAGNOSIS — Z98.890 OTHER SPECIFIED POSTPROCEDURAL STATES: Chronic | ICD-10-CM

## 2022-11-30 DIAGNOSIS — C64.2 MALIGNANT NEOPLASM OF LEFT KIDNEY, EXCEPT RENAL PELVIS: ICD-10-CM

## 2022-11-30 LAB
ALBUMIN SERPL ELPH-MCNC: 3.8 G/DL
ALP BLD-CCNC: 95 U/L
ALT SERPL-CCNC: 15 U/L
ANION GAP SERPL CALC-SCNC: 7 MMOL/L
AST SERPL-CCNC: 28 U/L
BILIRUB SERPL-MCNC: 0.8 MG/DL
BUN SERPL-MCNC: 17 MG/DL
CALCIUM SERPL-MCNC: 9.1 MG/DL
CHLORIDE SERPL-SCNC: 107 MMOL/L
CO2 SERPL-SCNC: 26 MMOL/L
CREAT SERPL-MCNC: 1.6 MG/DL
EGFR: 47 ML/MIN/1.73M2
GLUCOSE SERPL-MCNC: 142 MG/DL
POTASSIUM SERPL-SCNC: 4.4 MMOL/L
PROT SERPL-MCNC: 6.9 G/DL
SODIUM SERPL-SCNC: 140 MMOL/L

## 2022-11-30 PROCEDURE — 99214 OFFICE O/P EST MOD 30 MIN: CPT | Mod: 25

## 2022-11-30 PROCEDURE — 96413 CHEMO IV INFUSION 1 HR: CPT

## 2022-11-30 PROCEDURE — 36415 COLL VENOUS BLD VENIPUNCTURE: CPT

## 2022-11-30 RX ADMIN — PEMBROLIZUMAB 400 MILLIGRAM(S): 25 INJECTION, SOLUTION INTRAVENOUS at 14:45

## 2022-11-30 RX ADMIN — PEMBROLIZUMAB 400 MILLIGRAM(S): 25 INJECTION, SOLUTION INTRAVENOUS at 14:10

## 2022-11-30 NOTE — ASSESSMENT
[FreeTextEntry1] : Mr. 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 Garrett. Started C1 Pembro 200mg(q3 wks) on 12/7/21, C2 Pembro 400mg(q6 wks) on 12/28/21, C3 pembro 200mg on 2/8/22. C4 Pembro was held d/t  decreased TSH level. He visited  to [facility] in March and came back to [place] now. Resumed C4 pembrolizumab 200mg on 4/12/22. Developed hypothyroidism  now on levothyroxine 88mcg with Dr. Lockhart, s/p C8 pembrolizumab on 7/6/22. Received C9 pembrolizumab 400mg (6 week dose) on 7/26/22. s/p C11 on 10/19/22.  Today here for f/u with C12 last pembrolizumab \par \par Plan\par # Stage III  [id number], PD-L1  2-5% / Positive  G3 Renal cell carcinoma with IVC thrombosis, s/p Rt radical nephrectomy\par --receiving treatment with adjuvant Pembrolizumab x 1 year.  Today will be the last planned treatment. \par --Started C1 Pembro on 12/7/21. d/t trip to [place] in January, pembro was increased to 400mg Q 6 weeks with C2 on 12/28/21. Tolerating tx well w/o new symptoms.  Received C3 Pembro 200mg on 2/8/22. C4 Pembro was held d/t  decreased TSH level. Further work up delayed d/t trip to [place] in March.  Resumed C4 pembrolizumab 200mg on 4/12/22. Developed hypothyroidism  now on levothyroxine 88mcg with Dr. Lockhart, s/p C8  pembrolizumab(200mg) on 7/5/22.  Received C9 pembrolizumab ( 400mg 6 week dose per FDA label) on 7/26/22. Current plan is to complete 1 year of treatment (last 11/30/2022) as long as the patient proceeds w/o any treatment-limiting immune-mediated toxicities. Tolerating treatment well.  Labs reviewed and will proceed with C12 today \par --Surveillance CT C/A/P on 11/21/22 with no local tumor recurrence, VANNA. Will repeat scans in 4 months, will be the last week of March 2023. \par \par #Hypothyroidism/Hashimoto's  2/2 IO therapy\par -ct levothyroxine 88mcg and f/u with Dr. Lockhart, renewed Rx today and encouraged him to contact Dr. Lockhart's office. \par - Last TSH 1.25 on 10/19/22. Will repeat TSH today\par \par # duodenal nodule\par --s/p EGD + EUS with Dr. Bell on 8/4/22.  Per Dr Bell the duodenal papilla was "prominent" but no other findings to account for the duodenal nodule seen on the CT scan.  Inspection of the stomach did show findings of gastritis, confirmed on biopsy, H pylori and CMV/HSV negative\par --Completed PPI x 14 days for gastritis\par \par # CKD - stable \par --monitoring creatinine prior to each pembrolizumab treatment.\par \par RTC in April 2023 after CT C/A/P \par Labs with CBC, CMP, TSH/FT4\par \par pt was seen with STEPHEN Lockhart who served as Frisian  for the visit\par \par \par

## 2022-11-30 NOTE — REVIEW OF SYSTEMS
[Fatigue] : fatigue [Joint Pain] : joint pain [Muscle Pain] : muscle pain [Negative] : Heme/Lymph [Fever] : no fever [Chills] : no chills [Night Sweats] : no night sweats [Recent Change In Weight] : ~T no recent weight change [Shortness Of Breath] : no shortness of breath [Wheezing] : no wheezing [Cough] : no cough [SOB on Exertion] : no shortness of breath during exertion [Dysuria] : no dysuria [Incontinence] : no incontinence [Joint Stiffness] : no joint stiffness [Muscle Weakness] : no muscle weakness [Skin Rash] : no skin rash [Dizziness] : no dizziness [FreeTextEntry9] : chronic right shoulder pain

## 2022-11-30 NOTE — HISTORY OF PRESENT ILLNESS
[de-identified] : Mr. 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 Garrett. Started C1 Pembro 200mg (q3 wks) on 12/7/21, C2 Pembro 400mg (q6 wks) on 12/28/21, C3 pembro 200mg on 2/8/22.  C4 Pembro was held d/t  decreased TSH level. He visited to [facility] in March and came back to [place] now. Resumed C4 pembrolizumab 200mg on 4/12/22. Developed hypothyroidism  now on levothyroxine 88mcg with Dr. Lockhart. s/p C8 pembrolizumab on 7/6/22. Received C9 pembrolizumab 400mg (6 week dose) on 7/26/22. s/p C11 on 10/19/22 Today here for f/u with C12 last pembrolizumab.\par \par ONC Hx:\par Mr. Mills initially was seen by Dr. Brady on 10/18/21 for evaluation of renal mass. His PCP ordered US abdomen for health maintenance and he was found to have 10cm right renal mass.  He does not have any symptoms.  He had CT Kidney performed on 10/12/21 which showed 10.1 x 7.4 x 7.4cm heterogeneous enhancing partially exophytic mass mid to lower pole of right kidney with extensive neovascularity and extension to the renal pelvis at the lower pole,  infrahepatic IVC thrombosis.  He was then referred to Dr. Garrett on 10/20/21 who ordered CT chest and MRI abdomen.  CT Chest on 10/28/21 showed  no suspicious pulmonary finding, MRI abdomen on 10/28/21 with renal mass in right lower pole involving renal pelvis suspicious for renal cell carcinoma, 8.2 cm,  tumor thrombus within right renal vein with extension into infra hepatic IVC. He underwent right radical nephrectomy with  caval thrombus on 11/2/21, pathology showed [id number] Clear cell renal cell carcinoma. \par \par 10/12/21 CT kidney w/ and w/o at [facility]\par -10.1 x 7.4 x 7.4cm heterogeneous enhancing partially exophytic mass mid to lower pole of right kidney with extensive neovascularity and extension to the renal pelvis at the lower pole. No hydronephrosis. Filling defect within right main renal vein extending into infrahepatic IVC consistent with thrombosis. Few associated collateral vessels noted in right renal fossa region. No LAD.  \par \par 10/21/21 Urine Cytology\par NEGATIVE FOR HIGH GRADE UROTHELIAL CARCINOMA\par Squamous cells, urothelial cells, red blood cells, and degenerated cells.\par \par 10/28/21 CT Chest\par No suspicious pulmonary finding.\par Persistent thrombus within the right renal vein extending into the IVC.\par \par 10/28/21 MRI Abdomen\par 1. Renal mass in right lower pole involving renal pelvis suspicious for renal cell carcinoma, 8.2 cm.\par 2.Tumor thrombus within right renal vein with extension into infra hepatic IVC.\par \par 11/2/21\par Surgical Pathology Report\par Final Diagnosis\par Kidney, right; radical nephrectomy:\par - Clear cell renal cell carcinoma, Grade 3 - See Note and Synoptic\par  - Carcinoma thrombus involving the renal vein.\par - Carcinoma infiltrates perinephric renal fat.\par - Margins negative for carcinoma.\par \par Synoptic Summary\par KIDNEY: Nephrectomy\par SPECIMEN\par Procedure:  Radical nephrectomy\par Specimen Laterality:  Right\par TUMOR\par Histologic Type:  Clear cell renal cell carcinoma\par Histologic Grade:  G3: Nucleoli conspicuous and eosinophilic at 100x\par magnification\par Tumor Size: Greatest Dimension (Centimeters) -   7.5 cm\par Tumor Focality:  Unifocal\par Tumor Extension:  Tumor extension into perinephric tissue (beyond renal capsule), Tumor extension into major vein (renal vein or its segmental branches, inferior vena cava)\par Sarcomatoid Features:  Not identified\par Rhabdoid Features:  Not identified\par Tumor Necrosis:  Present\par Lymphovascular Invasion:  Present\par MARGINS\par Margins: Uninvolved by invasive carcinoma on this specimen (Clinically, tumor thrombus involving inferior vena cava removed at time of surgery)\par LYMPH NODES\par Regional Lymph Nodes:  No lymph nodes submitted or found\par Primary Tumor (pT):  pT3b\par Regional Lymph Nodes (pN):  pNX\par ADDITIONAL FINDINGS\par Pathologic Findings in Nonneoplastic Kidney:   None identified\par \par PD-L1 IMMUNOHISTOCHEMICAL ANALYSIS:\par Tumor Proportion Score: 2-5% / Positive\par \par 6/30/22 CT CAP\par 1. Since 10/28/2021, there has been right nephrectomy.\par 2. New 0.8 cm enhancing mass in the periampullary portion of the duodenum. Recommend endoscopy to evaluate for possible metastatic or primary neoplasm versus bulging papilla.\par 3. New consolidation surrounding calcific densities in anterior segment of right upper lobe. The calcific densities could represent broncholiths versus aspirated material if patient had been prone at the time. There could be surrounding infection. Recommend bronchoscopy.\par \par 11/21/22 CT CAP\par 1.  Decreased consolidative change surrounding cluster of calcified pulmonary nodules right middle lobe. This is likely post inflammatory or infectious etiology.\par 2.  Stable post right nephrectomy surgical bed. No complication or local tumor recurrence.\par 3.  A previously noted periampullary duodenal enhancing mass is not visualized on this examination. However, this may be due to the phase of contrast. [de-identified] : He presents to clinic for f/u with last pembro infusion. He continues to have mild fatigue and intermittent right shoulder pain. He stated he stopped levothyroxine 3 weeks ago due to inability to get a refill.   Otherwise he feels fine. Denies rash, itchiness, chills, cough,  fever, N/V/C/D, chest pain, or abdominal pain.

## 2022-12-01 LAB — TSH SERPL-ACNC: 17 UIU/ML

## 2023-01-11 ENCOUNTER — APPOINTMENT (OUTPATIENT)
Dept: INFUSION THERAPY | Facility: CLINIC | Age: 68
End: 2023-01-11

## 2023-02-15 ENCOUNTER — APPOINTMENT (OUTPATIENT)
Dept: UROLOGY | Facility: CLINIC | Age: 68
End: 2023-02-15
Payer: MEDICARE

## 2023-02-15 VITALS — HEART RATE: 83 BPM | DIASTOLIC BLOOD PRESSURE: 83 MMHG | TEMPERATURE: 97.9 F | SYSTOLIC BLOOD PRESSURE: 127 MMHG

## 2023-02-15 PROCEDURE — 99214 OFFICE O/P EST MOD 30 MIN: CPT

## 2023-02-15 NOTE — LETTER BODY
[Dear  ___] : Dear  [unfilled], [Courtesy Letter:] : I had the pleasure of seeing your patient, [unfilled], in my office today. [Please see my note below.] : Please see my note below. [Consult Closing:] : Thank you very much for allowing me to participate in the care of this patient.  If you have any questions, please do not hesitate to contact me. [Sincerely,] : Sincerely, [FreeTextEntry2] : Shirin Mills MD\par [address], [address]\[place] [address]  [FreeTextEntry3] : Jaden Garrett MD, FACS\par Urologic Oncology\par Department of Urology\par [facility]\par \par [employer] [facility] \par \par

## 2023-02-15 NOTE — HISTORY OF PRESENT ILLNESS
[None] : None [FreeTextEntry1] : 65 yo Arabic male with PMHx HTN presents for 10cm right renal mass. First discovered on abdominal US which he reports was ordered by his primary for health maintenance-he has no complaints. Previous abdominal surgery appendectomy. He has no family hx of  malignancies. He is a former smoker (8 pack year hx). Denies hematuria, CP, SOB, fevers, unexplained weight loss.\par \par Today he feels well overall except for a mild headache but he had 2 teeth removed last week and SHAHID has been relieved by advil.\par \par CT w/ and w/o at [facility] on 10/12/21: 10.1 x 7.4 x 7.4cm heterogeneous enhancing partially exophytic mass mid to lower pole of right kidney with extensive neovascularity and extension to the renal pelvis at the lower pole. No hydronephrosis. Filling defect within right main renal vein extending into infrahepatic IVC consistent with thrombosis. Few associated collateral vessels noted in right renal fossa region. No LAD.\par \par 11/10/21 Here for postop visit. Underwent right radical nephrectomy with caval thrombus 11/2. Path pending. Discharged home POD#2. Doing well. \par \par 2/16/22 Here for f/u. Undergoing adjuvant pempro with Dr. Gage. Doing well, tolerating treatment well. \par Recent labs reviewed\par Cr 1.6\par Alk phos 88\par normal LFTs\par normal CBC\par \par 5/16/22 Here for f/u. Undergoing adjuvant pempro with Dr. Gage. \par Labs reviewed today\par Cr 1.7\par Alk phos 102\par Ca 8.8\par Normal LFTs\par normal CBC\par \par 8/17/22 Here for f/u. Underwent CT C/A/P 6/30/22. There was a questionable duodenal nodule. He underwent endoscopy with biopsy which was negative for metastatic disease. He is feeling well, continues on adjuvant pembro with Dr. Ulrich. \par \par 2/15/23 Here for f/u. Completed pembro 11/2022. Had CT 11/2022 which was negative for recurrence, duodenal nodule was not visualized. He is feeling well, no complaints. Last creatinine 1.6

## 2023-02-15 NOTE — ASSESSMENT
[FreeTextEntry1] : 66 yo male with 10cm right renal mass and IVC thrombus. \par s/p radical nephrectomy 11/2/21\par pT3b, ccRCC, grade 2, negative margins\par Completed adjuvant pembro 11/2022\par Reviewed CT C/A/P 11/2022, no evidence of recurrence \par Creatinine stable \par Otherwise doing well without complaints\par F/u 6 months or sooner prn

## 2023-02-16 ENCOUNTER — APPOINTMENT (OUTPATIENT)
Age: 68
End: 2023-02-16
Payer: MEDICARE

## 2023-02-16 PROCEDURE — G0121 COLON CA SCRN NOT HI RSK IND: CPT

## 2023-02-17 ENCOUNTER — APPOINTMENT (OUTPATIENT)
Dept: ENDOCRINOLOGY | Facility: CLINIC | Age: 68
End: 2023-02-17
Payer: MEDICARE

## 2023-02-17 VITALS
TEMPERATURE: 98 F | SYSTOLIC BLOOD PRESSURE: 123 MMHG | HEIGHT: 68 IN | HEART RATE: 89 BPM | BODY MASS INDEX: 21.88 KG/M2 | OXYGEN SATURATION: 96 % | WEIGHT: 144.38 LBS | DIASTOLIC BLOOD PRESSURE: 79 MMHG

## 2023-02-17 PROCEDURE — 99214 OFFICE O/P EST MOD 30 MIN: CPT

## 2023-02-17 NOTE — HISTORY OF PRESENT ILLNESS
[FreeTextEntry1] : Patient is a 66 yo man, former smoker, HTN with recently diagnosed kidney cancer. He is here today for hypothyroidism\par \par Patient with renal mass in 2021. He had right radical nephrectomy. He is on RCT with pembrolizumab. Reports some cold intolerance two weeks ago.  TSH April was 49 and started on levothyroxine 88 mcg. The cold intolerance has improved. Patient reports cough and sneeze 5 weeks ago which resolved on its own. He does have a history of allergies and it may have been due to that. \par Reports there is some fatigue.  Gets tired after walking 1 hour\par Denies constipation. Appetite is stable and bowel movements are regular\par No family hx of thyroid disease\par No exposures to lithium/amiodarone/radiation\par Quite smoking 1 year ago\par Recent colonoscopy was normal\par \par Prediabetes: states appetite is up and down.  \par Snacks on cookies and crackers

## 2023-02-17 NOTE — REVIEW OF SYSTEMS
[Fatigue] : fatigue [Decreased Appetite] : appetite not decreased [Visual Field Defect] : no visual field defect [Dysphagia] : no dysphagia [Dysphonia] : no dysphonia [Chest Pain] : no chest pain [Slow Heart Rate] : heart rate is not slow [Palpitations] : no palpitations [Fast Heart Rate] : heart rate is not fast [SOB on Exertion] : shortness of breath on exertion [Nausea] : no nausea [Constipation] : no constipation [Vomiting] : no vomiting [Diarrhea] : no diarrhea [Depression] : no depression [Cold Intolerance] : no cold intolerance

## 2023-02-17 NOTE — ASSESSMENT
[Levothyroxine] : The patient was instructed to take Levothyroxine on an empty stomach, separate from vitamins, and wait at least 30 minutes before eating [FreeTextEntry1] : Patient is a 66 yo man with hypothyroidism and prediabetes here for follow up\par \par 1. Hypothyroid\par -thyroid dysfunction is common in patients treated with pembrolizumab as it can cause destructive thyroiditis and overt hypothyroidism\par -he developed chemical hypothyroidism when TSH level was checked April 12, 2022. TSH was 49 with a free T4 of 0.541. He was started on Levothyroxine 88 mcg po daily April 2022\par -patient did experiences symptoms including cold intolerance and fatigue which he believes has improved since being on medicines\par -reports baseline fatigue\par -he was chemically hypothyroid November 2022.  States the pharmacy had shortage of medicine and he had not taken the levothyroxine for about one month. Refills were provided by his oncologist and states adherence to it since\par -adheres to LT4 88 mcg po daily\par -check TFT's and adjust based on results\par \par 2. Prediabetes\par -repeat A1c today\par -encourage healthy, well rounded diet. Decrease sugars, incorporate healthy/complex carbs\par \par Follow up in 6 months, sooner if needed\par \par

## 2023-02-21 LAB
ESTIMATED AVERAGE GLUCOSE: 120 MG/DL
HBA1C MFR BLD HPLC: 5.8 %
T3 SERPL-MCNC: 84 NG/DL
T4 FREE SERPL-MCNC: 1.5 NG/DL
THYROGLOB AB SERPL-ACNC: 34.1 IU/ML
THYROPEROXIDASE AB SERPL IA-ACNC: 135 IU/ML
TSH SERPL-ACNC: 0.18 UIU/ML

## 2023-02-22 ENCOUNTER — APPOINTMENT (OUTPATIENT)
Dept: INFUSION THERAPY | Facility: CLINIC | Age: 68
End: 2023-02-22

## 2023-03-29 ENCOUNTER — APPOINTMENT (OUTPATIENT)
Dept: CT IMAGING | Facility: HOSPITAL | Age: 68
End: 2023-03-29
Payer: MEDICARE

## 2023-03-29 ENCOUNTER — OUTPATIENT (OUTPATIENT)
Dept: OUTPATIENT SERVICES | Facility: HOSPITAL | Age: 68
LOS: 1 days | End: 2023-03-29
Payer: MEDICARE

## 2023-03-29 DIAGNOSIS — Z90.49 ACQUIRED ABSENCE OF OTHER SPECIFIED PARTS OF DIGESTIVE TRACT: Chronic | ICD-10-CM

## 2023-03-29 DIAGNOSIS — Z98.890 OTHER SPECIFIED POSTPROCEDURAL STATES: Chronic | ICD-10-CM

## 2023-03-29 DIAGNOSIS — Z98.49 CATARACT EXTRACTION STATUS, UNSPECIFIED EYE: Chronic | ICD-10-CM

## 2023-03-29 PROCEDURE — 71260 CT THORAX DX C+: CPT | Mod: 26

## 2023-03-29 PROCEDURE — 71260 CT THORAX DX C+: CPT

## 2023-03-29 PROCEDURE — 82565 ASSAY OF CREATININE: CPT

## 2023-03-29 PROCEDURE — 74177 CT ABD & PELVIS W/CONTRAST: CPT | Mod: 26

## 2023-03-29 PROCEDURE — 74177 CT ABD & PELVIS W/CONTRAST: CPT

## 2023-04-04 ENCOUNTER — APPOINTMENT (OUTPATIENT)
Dept: HEMATOLOGY ONCOLOGY | Facility: CLINIC | Age: 68
End: 2023-04-04
Payer: MEDICARE

## 2023-04-04 VITALS
HEIGHT: 68 IN | OXYGEN SATURATION: 95 % | HEART RATE: 84 BPM | WEIGHT: 145 LBS | TEMPERATURE: 98 F | SYSTOLIC BLOOD PRESSURE: 136 MMHG | BODY MASS INDEX: 21.98 KG/M2 | DIASTOLIC BLOOD PRESSURE: 87 MMHG

## 2023-04-04 DIAGNOSIS — T45.1X5A ADVERSE EFFECT OF ANTINEOPLASTIC AND IMMUNOSUPPRESSIVE DRUGS, INITIAL ENCOUNTER: ICD-10-CM

## 2023-04-04 PROCEDURE — 99214 OFFICE O/P EST MOD 30 MIN: CPT

## 2023-04-04 NOTE — PHYSICAL EXAM
[Fully active, able to carry on all pre-disease performance without restriction] : Status 0 - Fully active, able to carry on all pre-disease performance without restriction [Thin] : thin [Normal] : normal appearance, no rash, nodules, vesicles, ulcers, erythema [de-identified] : supple [de-identified] : nomral HR [de-identified] : normal RR,  breathing pattern [de-identified] : no edema [de-identified] : not distended

## 2023-04-04 NOTE — HISTORY OF PRESENT ILLNESS
[de-identified] : Mr. 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 Garrett. Started C1 Pembro 200mg (q3 wks) on 12/7/21, C2 Pembro 400mg (q6 wks) on 12/28/21, C3 pembro 200mg on 2/8/22.  C4 Pembro was held d/t  decreased TSH level. He visited to [facility] in March and came back to [place] now. Resumed C4 pembrolizumab 200mg on 4/12/22. Developed hypothyroidism  now on levothyroxine 88mcg with Dr. Lockahrt. s/p C8 pembrolizumab on 7/6/22. Received C9 pembrolizumab 400mg (6 week dose) on 7/26/22. s/p C12  pembrolizumab. on 11/30/22. Today here for f/u after CT scan. \par \par ONC Hx:\par Mr. Mills initially was seen by Dr. Brady on 10/18/21 for evaluation of renal mass. His PCP ordered US abdomen for health maintenance and he was found to have 10cm right renal mass.  He does not have any symptoms.  He had CT Kidney performed on 10/12/21 which showed 10.1 x 7.4 x 7.4cm heterogeneous enhancing partially exophytic mass mid to lower pole of right kidney with extensive neovascularity and extension to the renal pelvis at the lower pole,  infrahepatic IVC thrombosis.  He was then referred to Dr. Garrett on 10/20/21 who ordered CT chest and MRI abdomen.  CT Chest on 10/28/21 showed  no suspicious pulmonary finding, MRI abdomen on 10/28/21 with renal mass in right lower pole involving renal pelvis suspicious for renal cell carcinoma, 8.2 cm,  tumor thrombus within right renal vein with extension into infra hepatic IVC. He underwent right radical nephrectomy with  caval thrombus on 11/2/21, pathology showed [id number] Clear cell renal cell carcinoma. \par \par 10/12/21 CT kidney w/ and w/o at [facility]\par -10.1 x 7.4 x 7.4cm heterogeneous enhancing partially exophytic mass mid to lower pole of right kidney with extensive neovascularity and extension to the renal pelvis at the lower pole. No hydronephrosis. Filling defect within right main renal vein extending into infrahepatic IVC consistent with thrombosis. Few associated collateral vessels noted in right renal fossa region. No LAD.  \par \par 10/21/21 Urine Cytology\par NEGATIVE FOR HIGH GRADE UROTHELIAL CARCINOMA\par Squamous cells, urothelial cells, red blood cells, and degenerated cells.\par \par 10/28/21 CT Chest\par No suspicious pulmonary finding.\par Persistent thrombus within the right renal vein extending into the IVC.\par \par 10/28/21 MRI Abdomen\par 1. Renal mass in right lower pole involving renal pelvis suspicious for renal cell carcinoma, 8.2 cm.\par 2.Tumor thrombus within right renal vein with extension into infra hepatic IVC.\par \par 11/2/21\par Surgical Pathology Report\par Final Diagnosis\par Kidney, right; radical nephrectomy:\par - Clear cell renal cell carcinoma, Grade 3 - See Note and Synoptic\par  - Carcinoma thrombus involving the renal vein.\par - Carcinoma infiltrates perinephric renal fat.\par - Margins negative for carcinoma.\par \par Synoptic Summary\par KIDNEY: Nephrectomy\par SPECIMEN\par Procedure:  Radical nephrectomy\par Specimen Laterality:  Right\par TUMOR\par Histologic Type:  Clear cell renal cell carcinoma\par Histologic Grade:  G3: Nucleoli conspicuous and eosinophilic at 100x\par magnification\par Tumor Size: Greatest Dimension (Centimeters) -   7.5 cm\par Tumor Focality:  Unifocal\par Tumor Extension:  Tumor extension into perinephric tissue (beyond renal capsule), Tumor extension into major vein (renal vein or its segmental branches, inferior vena cava)\par Sarcomatoid Features:  Not identified\par Rhabdoid Features:  Not identified\par Tumor Necrosis:  Present\par Lymphovascular Invasion:  Present\par MARGINS\par Margins: Uninvolved by invasive carcinoma on this specimen (Clinically, tumor thrombus involving inferior vena cava removed at time of surgery)\par LYMPH NODES\par Regional Lymph Nodes:  No lymph nodes submitted or found\par Primary Tumor (pT):  pT3b\par Regional Lymph Nodes (pN):  pNX\par ADDITIONAL FINDINGS\par Pathologic Findings in Nonneoplastic Kidney:   None identified\par \par PD-L1 IMMUNOHISTOCHEMICAL ANALYSIS:\par Tumor Proportion Score: 2-5% / Positive\par \par 6/30/22 CT CAP\par 1. Since 10/28/2021, there has been right nephrectomy.\par 2. New 0.8 cm enhancing mass in the periampullary portion of the duodenum. Recommend endoscopy to evaluate for possible metastatic or primary neoplasm versus bulging papilla.\par 3. New consolidation surrounding calcific densities in anterior segment of right upper lobe. The calcific densities could represent broncholiths versus aspirated material if patient had been prone at the time. There could be surrounding infection. Recommend bronchoscopy.\par \par 11/21/22 CT CAP\par 1.  Decreased consolidative change surrounding cluster of calcified pulmonary nodules right middle lobe. This is likely post inflammatory or infectious etiology.\par 2.  Stable post right nephrectomy surgical bed. No complication or local tumor recurrence.\par 3.  A previously noted periampullary duodenal enhancing mass is not visualized on this examination. However, this may be due to the phase of contrast.\par \par 3/29/23 CT CAP\par No evidence of local or distant disease recurrence.\par \par  [de-identified] : He presents to clinic for f/u after CT scans.  He reports occasional abdominal discomfort/dyspepsia, especially when he eats a lot. He has an appetite but lost a few pounds since last visit with us.  Otherwise he feels fine. Denies rash, itchiness, chills, cough,  fever, N/V/C/D, chest pain, or abdominal pain.

## 2023-04-04 NOTE — REVIEW OF SYSTEMS
[Negative] : Heme/Lymph [Recent Change In Weight] : ~T recent weight change [Fever] : no fever [Chills] : no chills [Night Sweats] : no night sweats [Fatigue] : no fatigue [Shortness Of Breath] : no shortness of breath [Wheezing] : no wheezing [Cough] : no cough [SOB on Exertion] : no shortness of breath during exertion [Abdominal Pain] : no abdominal pain [Vomiting] : no vomiting [Constipation] : no constipation [Diarrhea] : no diarrhea [Dysuria] : no dysuria [Incontinence] : no incontinence [Joint Pain] : no joint pain [Joint Stiffness] : no joint stiffness [Muscle Pain] : no muscle pain [Muscle Weakness] : no muscle weakness [Skin Rash] : no skin rash [Dizziness] : no dizziness [FreeTextEntry2] : weight loss [FreeTextEntry7] : occasional dyspepsia when he eats a lot.

## 2023-04-04 NOTE — ASSESSMENT
[FreeTextEntry1] : Mr. 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 Garrett. Started C1 Pembro 200mg(q3 wks) on 12/7/21, C2 Pembro 400mg(q6 wks) on 12/28/21, C3 pembro 200mg on 2/8/22. C4 Pembro was held d/t  decreased TSH level. He visited  to [facility] in March and came back to [place] now. Resumed C4 pembrolizumab 200mg on 4/12/22. Developed hypothyroidism  now on levothyroxine 88mcg with Dr. Lockhart, s/p C8 pembrolizumab on 7/6/22. Received C9 pembrolizumab 400mg (6 week dose) on 7/26/22.. s/p C12  pembrolizumab. on 11/30/22. Today here for f/u after CT scan. \par \par Plan\par # Stage III  [id number], PD-L1  2-5% / Positive  G3 Renal cell carcinoma with IVC thrombosis, s/p Rt radical nephrectomy\par --completed treatment with adjuvant Pembrolizumab x 1 year on 11/30/22 \par -- Surveillance CT C/A/P on 3/29/23 with no local tumor recurrence, VANNA. Will repeat scans in 4 months for active surveillance, ordered, will be repeating in August. \par \par #Hypothyroidism/Hashimoto's  2/2 IO therapy\par -ct levothyroxine 88mcg and f/u with Dr. Lockhart\par \par # duodenal nodule/occasional dyspepsia \par --s/p EGD + EUS with Dr. Bell on 8/4/22.  Per Dr Bell the duodenal papilla was "prominent" but no other findings to account for the duodenal nodule seen on the CT scan.  Inspection of the stomach did show findings of gastritis, confirmed on biopsy, H pylori and CMV/HSV negative\par --completed PPI x 14 days for gastritis\par --offered resuming omeprazole for dyspepsia but he refused.\par --encourage eat small meal frequently and monitor now.  If worse, will refer him to GI\par  \par # CKD - stable \par --he will repeat CBC/CMP in 1-2 weeks. \par --creatinine was 1.6 on 11/30/22\par \par RTC in August 2023 after CT C/A/P \par Labs with CBC, CMP\par \par pt was seen with STEPHEN Lockhart who served as Hebrew  for the visit\par \par \par

## 2023-04-05 ENCOUNTER — APPOINTMENT (OUTPATIENT)
Dept: INFUSION THERAPY | Facility: CLINIC | Age: 68
End: 2023-04-05

## 2023-05-17 ENCOUNTER — APPOINTMENT (OUTPATIENT)
Dept: INFUSION THERAPY | Facility: CLINIC | Age: 68
End: 2023-05-17

## 2023-06-19 RX ORDER — LEVOTHYROXINE SODIUM 88 UG/1
88 CAPSULE ORAL DAILY
Qty: 90 | Refills: 0 | Status: DISCONTINUED | COMMUNITY
Start: 2022-04-12 | End: 2023-06-19

## 2023-06-28 ENCOUNTER — APPOINTMENT (OUTPATIENT)
Dept: INFUSION THERAPY | Facility: CLINIC | Age: 68
End: 2023-06-28

## 2023-08-09 ENCOUNTER — OUTPATIENT (OUTPATIENT)
Dept: OUTPATIENT SERVICES | Facility: HOSPITAL | Age: 68
LOS: 1 days | End: 2023-08-09
Payer: MEDICARE

## 2023-08-09 ENCOUNTER — APPOINTMENT (OUTPATIENT)
Dept: CT IMAGING | Facility: HOSPITAL | Age: 68
End: 2023-08-09
Payer: MEDICARE

## 2023-08-09 DIAGNOSIS — Z98.49 CATARACT EXTRACTION STATUS, UNSPECIFIED EYE: Chronic | ICD-10-CM

## 2023-08-09 DIAGNOSIS — Z98.890 OTHER SPECIFIED POSTPROCEDURAL STATES: Chronic | ICD-10-CM

## 2023-08-09 DIAGNOSIS — Z90.49 ACQUIRED ABSENCE OF OTHER SPECIFIED PARTS OF DIGESTIVE TRACT: Chronic | ICD-10-CM

## 2023-08-09 LAB — POCT ISTAT CREATININE: 1.5 MG/DL — HIGH (ref 0.5–1.3)

## 2023-08-09 PROCEDURE — 74177 CT ABD & PELVIS W/CONTRAST: CPT | Mod: 26

## 2023-08-09 PROCEDURE — 71260 CT THORAX DX C+: CPT | Mod: 26

## 2023-08-09 PROCEDURE — 82565 ASSAY OF CREATININE: CPT

## 2023-08-09 PROCEDURE — 74177 CT ABD & PELVIS W/CONTRAST: CPT

## 2023-08-09 PROCEDURE — 71260 CT THORAX DX C+: CPT

## 2023-08-15 ENCOUNTER — APPOINTMENT (OUTPATIENT)
Dept: HEMATOLOGY ONCOLOGY | Facility: CLINIC | Age: 68
End: 2023-08-15
Payer: MEDICARE

## 2023-08-15 VITALS
DIASTOLIC BLOOD PRESSURE: 79 MMHG | WEIGHT: 146 LBS | OXYGEN SATURATION: 95 % | SYSTOLIC BLOOD PRESSURE: 127 MMHG | HEART RATE: 88 BPM | RESPIRATION RATE: 16 BRPM | BODY MASS INDEX: 22.2 KG/M2 | TEMPERATURE: 98.5 F

## 2023-08-15 PROCEDURE — 99213 OFFICE O/P EST LOW 20 MIN: CPT

## 2023-08-15 NOTE — REVIEW OF SYSTEMS
[FreeTextEntry2] : weight loss [FreeTextEntry7] : occasional dyspepsia when he eats a lot.  [Fatigue] : fatigue [Negative] : Musculoskeletal [Fever] : no fever [Chills] : no chills [Night Sweats] : no night sweats [Recent Change In Weight] : ~T no recent weight change [Shortness Of Breath] : no shortness of breath [Wheezing] : no wheezing [Cough] : no cough [SOB on Exertion] : no shortness of breath during exertion [Abdominal Pain] : no abdominal pain [Vomiting] : no vomiting [Constipation] : no constipation [Dysuria] : no dysuria [Incontinence] : no incontinence [Joint Pain] : no joint pain [Joint Stiffness] : no joint stiffness [Muscle Pain] : no muscle pain [Muscle Weakness] : no muscle weakness [Skin Rash] : no skin rash [Skin Wound] : no skin wound [Dizziness] : no dizziness

## 2023-08-15 NOTE — HISTORY OF PRESENT ILLNESS
[de-identified] : Mr. 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 Garrett. Started C1 Pembro 200mg (q3 wks) on 12/7/21, C2 Pembro 400mg (q6 wks) on 12/28/21, C3 pembro 200mg on 2/8/22.  C4 Pembro was held d/t  decreased TSH level. He visited to [facility] in March and came back to [place] now. Resumed C4 pembrolizumab 200mg on 4/12/22. Developed hypothyroidism  now on levothyroxine 88mcg with Dr. Lockhart. s/p C8 pembrolizumab on 7/6/22. Received C9 pembrolizumab 400mg (6 week dose) on 7/26/22. s/p C12  pembrolizumab. on 11/30/22. Today here for f/u after CT scan.   ONC Hx: Mr. Mills initially was seen by Dr. Brady on 10/18/21 for evaluation of renal mass. His PCP ordered US abdomen for health maintenance and he was found to have 10cm right renal mass.  He does not have any symptoms.  He had CT Kidney performed on 10/12/21 which showed 10.1 x 7.4 x 7.4cm heterogeneous enhancing partially exophytic mass mid to lower pole of right kidney with extensive neovascularity and extension to the renal pelvis at the lower pole,  infrahepatic IVC thrombosis.  He was then referred to Dr. Garrett on 10/20/21 who ordered CT chest and MRI abdomen.  CT Chest on 10/28/21 showed  no suspicious pulmonary finding, MRI abdomen on 10/28/21 with renal mass in right lower pole involving renal pelvis suspicious for renal cell carcinoma, 8.2 cm,  tumor thrombus within right renal vein with extension into infra hepatic IVC. He underwent right radical nephrectomy with  caval thrombus on 11/2/21, pathology showed [id number] Clear cell renal cell carcinoma.   10/12/21 CT kidney w/ and w/o at [facility] -10.1 x 7.4 x 7.4cm heterogeneous enhancing partially exophytic mass mid to lower pole of right kidney with extensive neovascularity and extension to the renal pelvis at the lower pole. No hydronephrosis. Filling defect within right main renal vein extending into infrahepatic IVC consistent with thrombosis. Few associated collateral vessels noted in right renal fossa region. No LAD.    10/21/21 Urine Cytology NEGATIVE FOR HIGH GRADE UROTHELIAL CARCINOMA Squamous cells, urothelial cells, red blood cells, and degenerated cells.  10/28/21 CT Chest No suspicious pulmonary finding. Persistent thrombus within the right renal vein extending into the IVC.  10/28/21 MRI Abdomen 1. Renal mass in right lower pole involving renal pelvis suspicious for renal cell carcinoma, 8.2 cm. 2.Tumor thrombus within right renal vein with extension into infra hepatic IVC.  11/2/21 Surgical Pathology Report Final Diagnosis Kidney, right; radical nephrectomy: - Clear cell renal cell carcinoma, Grade 3 - See Note and Synoptic  - Carcinoma thrombus involving the renal vein. - Carcinoma infiltrates perinephric renal fat. - Margins negative for carcinoma.  Synoptic Summary KIDNEY: Nephrectomy SPECIMEN Procedure:  Radical nephrectomy Specimen Laterality:  Right TUMOR Histologic Type:  Clear cell renal cell carcinoma Histologic Grade:  G3: Nucleoli conspicuous and eosinophilic at 100x magnification Tumor Size: Greatest Dimension (Centimeters) -   7.5 cm Tumor Focality:  Unifocal Tumor Extension:  Tumor extension into perinephric tissue (beyond renal capsule), Tumor extension into major vein (renal vein or its segmental branches, inferior vena cava) Sarcomatoid Features:  Not identified Rhabdoid Features:  Not identified Tumor Necrosis:  Present Lymphovascular Invasion:  Present MARGINS Margins: Uninvolved by invasive carcinoma on this specimen (Clinically, tumor thrombus involving inferior vena cava removed at time of surgery) LYMPH NODES Regional Lymph Nodes:  No lymph nodes submitted or found Primary Tumor (pT):  pT3b Regional Lymph Nodes (pN):  pNX ADDITIONAL FINDINGS Pathologic Findings in Nonneoplastic Kidney:   None identified  PD-L1 IMMUNOHISTOCHEMICAL ANALYSIS: Tumor Proportion Score: 2-5% / Positive  6/30/22 CT CAP 1. Since 10/28/2021, there has been right nephrectomy. 2. New 0.8 cm enhancing mass in the periampullary portion of the duodenum. Recommend endoscopy to evaluate for possible metastatic or primary neoplasm versus bulging papilla. 3. New consolidation surrounding calcific densities in anterior segment of right upper lobe. The calcific densities could represent broncholiths versus aspirated material if patient had been prone at the time. There could be surrounding infection. Recommend bronchoscopy.  11/21/22 CT CAP 1.  Decreased consolidative change surrounding cluster of calcified pulmonary nodules right middle lobe. This is likely post inflammatory or infectious etiology. 2.  Stable post right nephrectomy surgical bed. No complication or local tumor recurrence. 3.  A previously noted periampullary duodenal enhancing mass is not visualized on this examination. However, this may be due to the phase of contrast.  3/29/23 CT CAP No evidence of local or distant disease recurrence.  8/9/23 CT CAP Status post right nephrectomy. No local recurrence or distant metastasis.  [de-identified] : He presents to clinic for f/u after CT scans.  He reports mild fatigue, especially walking for long distances, some weight loss in the past 7-8 months, currently 145lbs. Otherwise, he feels fine w/o new symptoms. Denies rash, itchiness, chills, cough, fever, N/V/C/D, chest pain, or abdominal pain.

## 2023-08-15 NOTE — ASSESSMENT
[FreeTextEntry1] : Mr. 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 Garrett. Started C1 Pembro 200mg(q3 wks) on 12/7/21, C2 Pembro 400mg(q6 wks) on 12/28/21, C3 pembro 200mg on 2/8/22. C4 Pembro was held d/t  decreased TSH level. He visited [place] in March; Resumed C4 pembrolizumab 200mg on 4/12/22. Developed hypothyroidism  now on levothyroxine 88mcg with Dr. Lockhart, s/p C8 pembrolizumab on 7/6/22. Received C9 pembrolizumab 400mg (6 week dose) on 7/26/22.. s/p C12  pembrolizumab (final) on 11/30/22. Today here for f/u after CT scan.   Plan # Stage III  [id number], PD-L1  2-5% / Positive  G3 Renal cell carcinoma with IVC thrombosis, s/p Rt radical nephrectomy --completed treatment with adjuvant Pembrolizumab x 1 year on 11/30/22  --surveillance CT C/A/P on 8/9/23 with VANNA. Will repeat scans in 4 months for active surveillance, scheduled for 12/1/23.  --he is doing well with mild fatigue and will repeat CBC/CMP when he sees Dr. Lockhart on 8/18.   #hypothyroidism/Hashimoto's  2/2 IO therapy -ct levothyroxine 88mcg and f/u with Dr. Lockhart  # duodenal nodule/occasional dyspepsia  --s/p EGD + EUS with Dr. Bell on 8/4/22.  Per Dr Bell the duodenal papilla was "prominent" but no other findings to account for the duodenal nodule seen on the CT scan.  Inspection of the stomach did show findings of gastritis, confirmed on biopsy, H pylori and CMV/HSV negative --completed PPI previously    # CKD  --he will repeat CBC/CMP on 8/18 --creatinine was 1.6 on 11/30/22 --ordering CT with contrast, encouraged to hydrate orally after the scan.    RTC on 12/12/23 after CT C/A/P  Labs with CBC, CMP  pt was seen with STEPHEN Lockhart who served as English  for the visit

## 2023-08-15 NOTE — PHYSICAL EXAM
[Fully active, able to carry on all pre-disease performance without restriction] : Status 0 - Fully active, able to carry on all pre-disease performance without restriction [Thin] : thin [Normal] : normal appearance, no rash, nodules, vesicles, ulcers, erythema [de-identified] : supple [de-identified] : normal RR,  breathing pattern [de-identified] : nomral HR [de-identified] : no edema [de-identified] : not distended

## 2023-08-16 ENCOUNTER — APPOINTMENT (OUTPATIENT)
Dept: UROLOGY | Facility: CLINIC | Age: 68
End: 2023-08-16
Payer: MEDICARE

## 2023-08-16 VITALS — HEART RATE: 82 BPM | DIASTOLIC BLOOD PRESSURE: 86 MMHG | SYSTOLIC BLOOD PRESSURE: 158 MMHG | TEMPERATURE: 97.8 F

## 2023-08-16 DIAGNOSIS — Z85.528 PERSONAL HISTORY OF OTHER MALIGNANT NEOPLASM OF KIDNEY: ICD-10-CM

## 2023-08-16 DIAGNOSIS — I82.220 ACUTE EMBOLISM AND THROMBOSIS OF INFERIOR VENA CAVA: ICD-10-CM

## 2023-08-16 PROCEDURE — 99213 OFFICE O/P EST LOW 20 MIN: CPT

## 2023-08-16 NOTE — HISTORY OF PRESENT ILLNESS
[None] : None [FreeTextEntry1] : 65 yo Chinese male with PMHx HTN presents for 10cm right renal mass. First discovered on abdominal US which he reports was ordered by his primary for health maintenance-he has no complaints. Previous abdominal surgery appendectomy. He has no family hx of  malignancies. He is a former smoker (8 pack year hx). Denies hematuria, CP, SOB, fevers, unexplained weight loss.  Today he feels well overall except for a mild headache but he had 2 teeth removed last week and SHAHID has been relieved by advil.  CT w/ and w/o at [facility] on 10/12/21: 10.1 x 7.4 x 7.4cm heterogeneous enhancing partially exophytic mass mid to lower pole of right kidney with extensive neovascularity and extension to the renal pelvis at the lower pole. No hydronephrosis. Filling defect within right main renal vein extending into infrahepatic IVC consistent with thrombosis. Few associated collateral vessels noted in right renal fossa region. No LAD.  11/10/21 Here for postop visit. Underwent right radical nephrectomy with caval thrombus 11/2. Path pending. Discharged home POD#2. Doing well.   2/16/22 Here for f/u. Undergoing adjuvant pempro with Dr. Gage. Doing well, tolerating treatment well.  Recent labs reviewed Cr 1.6 Alk phos 88 normal LFTs normal CBC  5/16/22 Here for f/u. Undergoing adjuvant pempro with Dr. Gage.  Labs reviewed today Cr 1.7 Alk phos 102 Ca 8.8 Normal LFTs normal CBC  8/17/22 Here for f/u. Underwent CT C/A/P 6/30/22. There was a questionable duodenal nodule. He underwent endoscopy with biopsy which was negative for metastatic disease. He is feeling well, continues on adjuvant pembro with Dr. Ulrich.   2/15/23 Here for f/u. Completed pembro 11/2022. Had CT 11/2022 which was negative for recurrence, duodenal nodule was not visualized. He is feeling well, no complaints. Last creatinine 1.6  8/16/23 Here for f/u. Underwent CT C/A/P last week, no evidence of recurrence. Feeling well, no complaints.

## 2023-08-16 NOTE — ASSESSMENT
[FreeTextEntry1] : 66 yo male with 10cm right renal mass and IVC thrombus.  s/p radical nephrectomy 11/2/21 pT3b, ccRCC, grade 2, negative margins Completed adjuvant pembro 11/2022 Reviewed CT C/A/P 8/2023, no evidence of recurrence  Creatinine stable  Otherwise doing well without complaints F/u 6 months or sooner prn

## 2023-08-16 NOTE — LETTER BODY
[Dear  ___] : Dear  [unfilled], [Courtesy Letter:] : I had the pleasure of seeing your patient, [unfilled], in my office today. [Please see my note below.] : Please see my note below. [Consult Closing:] : Thank you very much for allowing me to participate in the care of this patient.  If you have any questions, please do not hesitate to contact me. [Sincerely,] : Sincerely, [FreeTextEntry2] : Shirin Mills MD\par  [address], [address]\[place]  [place] [address]  [FreeTextEntry3] : Jaden Garrett MD, FACS\par  Urologic Oncology\par  Department of Urology\par  [facility] [place] [facility]\par  \par  [employer] [facility] \par  \par

## 2023-08-18 ENCOUNTER — APPOINTMENT (OUTPATIENT)
Dept: ENDOCRINOLOGY | Facility: CLINIC | Age: 68
End: 2023-08-18
Payer: MEDICARE

## 2023-08-18 VITALS
HEART RATE: 85 BPM | DIASTOLIC BLOOD PRESSURE: 73 MMHG | TEMPERATURE: 97.1 F | BODY MASS INDEX: 21.82 KG/M2 | OXYGEN SATURATION: 97 % | SYSTOLIC BLOOD PRESSURE: 148 MMHG | HEIGHT: 68 IN | WEIGHT: 144 LBS

## 2023-08-18 VITALS — DIASTOLIC BLOOD PRESSURE: 87 MMHG | SYSTOLIC BLOOD PRESSURE: 132 MMHG

## 2023-08-18 DIAGNOSIS — E03.9 HYPOTHYROIDISM, UNSPECIFIED: ICD-10-CM

## 2023-08-18 DIAGNOSIS — R73.03 PREDIABETES.: ICD-10-CM

## 2023-08-18 PROCEDURE — 99214 OFFICE O/P EST MOD 30 MIN: CPT

## 2023-08-18 NOTE — ASSESSMENT
[Levothyroxine] : The patient was instructed to take Levothyroxine on an empty stomach, separate from vitamins, and wait at least 30 minutes before eating [FreeTextEntry1] : Patient is a 66 yo man with hypothyroidism and prediabetes here for follow up  1. Hypothyroid -thyroid dysfunction is common in patients treated with pembrolizumab as it can cause destructive thyroiditis and overt hypothyroidism. He completed therapy in 2022 -he developed chemical hypothyroidism when TSH level was checked April 12, 2022. TSH was 49 with a free T4 of 0.541. He was started on Levothyroxine 88 mcg po daily April 2022 -reports baseline fatigue -adheres to LT4 88 mcg po daily -check TFT's and adjust based on results  2. Prediabetes -repeat A1c today -encourage healthy, well rounded diet. Decrease sugars, incorporate healthy/complex carbs  Follow up in 6 months, sooner if needed

## 2023-08-18 NOTE — REVIEW OF SYSTEMS
[Fatigue] : fatigue [Dysphagia] : no dysphagia [Dysphonia] : no dysphonia [Chest Pain] : no chest pain [Slow Heart Rate] : heart rate is not slow [Palpitations] : no palpitations [Fast Heart Rate] : heart rate is not fast [Shortness Of Breath] : no shortness of breath [Nausea] : no nausea [Vomiting] : no vomiting

## 2023-08-18 NOTE — HISTORY OF PRESENT ILLNESS
[FreeTextEntry1] : Patient is a 68 yo man, former smoker, HTN with recently diagnosed kidney cancer. He is here today for hypothyroidism  Patient with renal mass in 2021. He had right radical nephrectomy. He is on RCT with pembrolizumab. Reports some cold intolerance two weeks ago. TSH April was 49 and started on levothyroxine 88 mcg. The cold intolerance has improved. Patient reports cough and sneeze 5 weeks ago which resolved on its own. He does have a history of allergies and it may have been due to that. TSH was low February 2023.   I recommended Lt4 88 mcg 6/7 days of the week with 1/2 pill once a week but he admits to taking one whole pill daily Reports there is some fatigue. No changes in symptoms/health.  Completed chemotherapy for renal cancer July 2022 Denies constipation. Appetite is stable and bowel movements are regular No family hx of thyroid disease No exposures to lithium/amiodarone/radiation Former smoker  Prediabetes: states appetite is up and down. Snacks on cookies and crackers

## 2023-08-21 LAB
ESTIMATED AVERAGE GLUCOSE: 120 MG/DL
HBA1C MFR BLD HPLC: 5.8 %
T4 FREE SERPL-MCNC: 1.5 NG/DL
TSH SERPL-ACNC: 0.5 UIU/ML

## 2023-11-15 ENCOUNTER — RESULT REVIEW (OUTPATIENT)
Age: 68
End: 2023-11-15

## 2023-12-27 ENCOUNTER — OUTPATIENT (OUTPATIENT)
Dept: OUTPATIENT SERVICES | Facility: HOSPITAL | Age: 68
LOS: 1 days | End: 2023-12-27
Payer: MEDICARE

## 2023-12-27 ENCOUNTER — APPOINTMENT (OUTPATIENT)
Dept: CT IMAGING | Facility: HOSPITAL | Age: 68
End: 2023-12-27

## 2023-12-27 DIAGNOSIS — Z90.49 ACQUIRED ABSENCE OF OTHER SPECIFIED PARTS OF DIGESTIVE TRACT: Chronic | ICD-10-CM

## 2023-12-27 DIAGNOSIS — Z98.49 CATARACT EXTRACTION STATUS, UNSPECIFIED EYE: Chronic | ICD-10-CM

## 2023-12-27 DIAGNOSIS — Z98.890 OTHER SPECIFIED POSTPROCEDURAL STATES: Chronic | ICD-10-CM

## 2023-12-27 LAB
POCT ISTAT CREATININE: 1.5 MG/DL — HIGH (ref 0.5–1.3)
POCT ISTAT CREATININE: 1.5 MG/DL — HIGH (ref 0.5–1.3)

## 2023-12-27 PROCEDURE — 71260 CT THORAX DX C+: CPT

## 2023-12-27 PROCEDURE — 71260 CT THORAX DX C+: CPT | Mod: 26

## 2023-12-27 PROCEDURE — 82565 ASSAY OF CREATININE: CPT

## 2023-12-27 PROCEDURE — 74177 CT ABD & PELVIS W/CONTRAST: CPT | Mod: 26

## 2023-12-27 PROCEDURE — 74177 CT ABD & PELVIS W/CONTRAST: CPT

## 2024-01-09 ENCOUNTER — APPOINTMENT (OUTPATIENT)
Dept: HEMATOLOGY ONCOLOGY | Facility: CLINIC | Age: 69
End: 2024-01-09
Payer: MEDICARE

## 2024-01-09 VITALS
DIASTOLIC BLOOD PRESSURE: 83 MMHG | OXYGEN SATURATION: 96 % | HEIGHT: 68 IN | WEIGHT: 145 LBS | RESPIRATION RATE: 16 BRPM | SYSTOLIC BLOOD PRESSURE: 134 MMHG | BODY MASS INDEX: 21.98 KG/M2 | HEART RATE: 76 BPM

## 2024-01-09 PROCEDURE — 99214 OFFICE O/P EST MOD 30 MIN: CPT

## 2024-02-12 ENCOUNTER — APPOINTMENT (OUTPATIENT)
Dept: ENDOCRINOLOGY | Facility: CLINIC | Age: 69
End: 2024-02-12
Payer: MEDICARE

## 2024-02-12 VITALS
WEIGHT: 150 LBS | HEART RATE: 86 BPM | TEMPERATURE: 96.8 F | HEIGHT: 68 IN | DIASTOLIC BLOOD PRESSURE: 71 MMHG | BODY MASS INDEX: 22.73 KG/M2 | SYSTOLIC BLOOD PRESSURE: 128 MMHG | OXYGEN SATURATION: 98 %

## 2024-02-12 DIAGNOSIS — E06.3 AUTOIMMUNE THYROIDITIS: ICD-10-CM

## 2024-02-12 PROCEDURE — G2211 COMPLEX E/M VISIT ADD ON: CPT

## 2024-02-12 PROCEDURE — 99213 OFFICE O/P EST LOW 20 MIN: CPT

## 2024-02-12 NOTE — ASSESSMENT
[Levothyroxine] : The patient was instructed to take Levothyroxine on an empty stomach, separate from vitamins, and wait at least 30 minutes before eating [FreeTextEntry1] : Patient is a 66 yo man with hypothyroidism and prediabetes here for follow up  1. Hypothyroid -thyroid dysfunction is common in patients treated with pembrolizumab as it can cause destructive thyroiditis and overt hypothyroidism. He completed therapy in 2022 and feels well.  -he developed chemical hypothyroidism when TSH level was checked April 12, 2022. TSH was 49 with a free T4 of 0.541. He was started on Levothyroxine 88 mcg po daily April 2022 -adheres to LT4 88 mcg po daily -check TFT's and adjust based on results.  He will go down to phlebotomy to get labs drawn as he has other blood work that hematology needs -will adjust levothyroxine based on results -he is clinically euthyroid  Follow up in 6 months

## 2024-02-12 NOTE — PHYSICAL EXAM
[Alert] : alert [No Acute Distress] : no acute distress [EOMI] : extra ocular movement intact [Thyroid Not Enlarged] : the thyroid was not enlarged [No Respiratory Distress] : no respiratory distress [No Accessory Muscle Use] : no accessory muscle use [Clear to Auscultation] : lungs were clear to auscultation bilaterally [Normal S1, S2] : normal S1 and S2 [Normal Rate] : heart rate was normal [Normal Bowel Sounds] : normal bowel sounds [Soft] : abdomen soft [Normal Gait] : normal gait [No Motor Deficits] : the motor exam was normal [Normal Affect] : the affect was normal [Normal Insight/Judgement] : insight and judgment were intact [Normal Mood] : the mood was normal

## 2024-02-12 NOTE — HISTORY OF PRESENT ILLNESS
[FreeTextEntry1] : Patient is a 67 yo man, former smoker, HTN with recently diagnosed kidney cancer. He is here today for hypothyroidism follow up  Patient with renal mass in 2021. He had right radical nephrectomy. He is on RCT with pembrolizumab. Reports some cold intolerance two weeks ago. TSH April was 49 and started on levothyroxine 88 mcg. The cold intolerance has improved. Reports there is some fatigue. No changes in symptoms/health. Completed chemotherapy for renal cancer July 2022 Adheres to Lt4 88 mcg po daily Denies constipation. Appetite is stable and bowel movements are regular No family hx of thyroid disease No exposures to lithium/amiodarone/radiation Former smoker  Prediabetes: states appetite is up and down. Snacks on cookies and crackers

## 2024-02-12 NOTE — REVIEW OF SYSTEMS
[Fatigue] : no fatigue [Dysphagia] : no dysphagia [Dysphonia] : no dysphonia [Shortness Of Breath] : no shortness of breath [Cough] : no cough [Nausea] : no nausea [Constipation] : no constipation [Vomiting] : no vomiting [Diarrhea] : no diarrhea [Headaches] : no headaches [Depression] : no depression [Cold Intolerance] : no cold intolerance [Heat Intolerance] : no heat intolerance

## 2024-02-13 LAB
ALBUMIN SERPL ELPH-MCNC: 4.1 G/DL
ALP BLD-CCNC: 95 U/L
ALT SERPL-CCNC: 13 U/L
ANION GAP SERPL CALC-SCNC: 9 MMOL/L
AST SERPL-CCNC: 22 U/L
BASOPHILS # BLD AUTO: 0.05 K/UL
BASOPHILS NFR BLD AUTO: 0.7 %
BILIRUB SERPL-MCNC: 0.2 MG/DL
BUN SERPL-MCNC: 21 MG/DL
CALCIUM SERPL-MCNC: 9.3 MG/DL
CHLORIDE SERPL-SCNC: 106 MMOL/L
CO2 SERPL-SCNC: 26 MMOL/L
CREAT SERPL-MCNC: 1.52 MG/DL
EGFR: 50 ML/MIN/1.73M2
EOSINOPHIL # BLD AUTO: 0.06 K/UL
EOSINOPHIL NFR BLD AUTO: 0.9 %
GLUCOSE SERPL-MCNC: 91 MG/DL
HCT VFR BLD CALC: 42 %
HGB BLD-MCNC: 14.1 G/DL
IMM GRANULOCYTES NFR BLD AUTO: 0.1 %
LYMPHOCYTES # BLD AUTO: 1.6 K/UL
LYMPHOCYTES NFR BLD AUTO: 24 %
MAN DIFF?: NORMAL
MCHC RBC-ENTMCNC: 29.6 PG
MCHC RBC-ENTMCNC: 33.6 GM/DL
MCV RBC AUTO: 88.1 FL
MONOCYTES # BLD AUTO: 0.52 K/UL
MONOCYTES NFR BLD AUTO: 7.8 %
NEUTROPHILS # BLD AUTO: 4.44 K/UL
NEUTROPHILS NFR BLD AUTO: 66.5 %
PLATELET # BLD AUTO: 202 K/UL
POTASSIUM SERPL-SCNC: 5.3 MMOL/L
PROT SERPL-MCNC: 7 G/DL
RBC # BLD: 4.77 M/UL
RBC # FLD: 12.4 %
SODIUM SERPL-SCNC: 141 MMOL/L
T4 FREE SERPL-MCNC: 1.3 NG/DL
THYROGLOB AB SERPL-ACNC: <20 IU/ML
THYROPEROXIDASE AB SERPL IA-ACNC: 71.8 IU/ML
TSH SERPL-ACNC: 0.29 UIU/ML
WBC # FLD AUTO: 6.68 K/UL

## 2024-02-14 ENCOUNTER — APPOINTMENT (OUTPATIENT)
Dept: UROLOGY | Facility: CLINIC | Age: 69
End: 2024-02-14

## 2024-02-26 ENCOUNTER — APPOINTMENT (OUTPATIENT)
Dept: UROLOGY | Facility: CLINIC | Age: 69
End: 2024-02-26
Payer: MEDICARE

## 2024-02-26 VITALS
WEIGHT: 150 LBS | TEMPERATURE: 97.2 F | DIASTOLIC BLOOD PRESSURE: 87 MMHG | HEIGHT: 68 IN | HEART RATE: 86 BPM | SYSTOLIC BLOOD PRESSURE: 144 MMHG | BODY MASS INDEX: 22.73 KG/M2

## 2024-02-26 DIAGNOSIS — N28.9 DISORDER OF KIDNEY AND URETER, UNSPECIFIED: ICD-10-CM

## 2024-02-26 PROCEDURE — 99214 OFFICE O/P EST MOD 30 MIN: CPT

## 2024-02-26 PROCEDURE — G2211 COMPLEX E/M VISIT ADD ON: CPT

## 2024-02-26 NOTE — ASSESSMENT
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 Completed adjuvant pembro 11/2022. Reviewed CT C/A/P 8/2023 and 12/2023 with no evidence of recurrence. Creatinine stable. Otherwise doing well without complaints.  F/u 6 months or sooner prn

## 2024-02-26 NOTE — LETTER BODY
[Courtesy Letter:] : I had the pleasure of seeing your patient, [unfilled], in my office today. [Dear  ___] : Dear  [unfilled], [Please see my note below.] : Please see my note below. [Consult Closing:] : Thank you very much for allowing me to participate in the care of this patient.  If you have any questions, please do not hesitate to contact me. [Sincerely,] : Sincerely, [FreeTextEntry2] : Shirin Mills MD\par  [address], [address]\[place] [address]  [FreeTextEntry3] : Paulino Cross MD

## 2024-02-26 NOTE — PHYSICAL EXAM
[General Appearance - Well Developed] : well developed [Normal Appearance] : normal appearance [General Appearance - Well Nourished] : well nourished [Well Groomed] : well groomed [General Appearance - In No Acute Distress] : no acute distress [Abdomen Soft] : soft [Abdomen Tenderness] : non-tender [Costovertebral Angle Tenderness] : no ~M costovertebral angle tenderness [FreeTextEntry1] : incision well healed

## 2024-02-26 NOTE — HISTORY OF PRESENT ILLNESS
[None] : None [FreeTextEntry1] : 67 yo Sami male with PMHx HTN presents for 10cm right renal mass. First discovered on abdominal US which he reports was ordered by his primary for health maintenance-he has no complaints. Previous abdominal surgery appendectomy. He has no family hx of  malignancies. He is a former smoker (8 pack year hx). Denies hematuria, CP, SOB, fevers, unexplained weight loss.  Today he feels well overall except for a mild headache but he had 2 teeth removed last week and SHAHID has been relieved by advil.  CT w/ and w/o at [facility] on 10/12/21: 10.1 x 7.4 x 7.4cm heterogeneous enhancing partially exophytic mass mid to lower pole of right kidney with extensive neovascularity and extension to the renal pelvis at the lower pole. No hydronephrosis. Filling defect within right main renal vein extending into infrahepatic IVC consistent with thrombosis. Few associated collateral vessels noted in right renal fossa region. No LAD.  11/10/21 Here for postop visit. Underwent right radical nephrectomy with caval thrombus 11/2. Path pending. Discharged home POD#2. Doing well.   2/16/22 Here for f/u. Undergoing adjuvant pempro with Dr. Gage. Doing well, tolerating treatment well.  Recent labs reviewed Cr 1.6 Alk phos 88 normal LFTs normal CBC  5/16/22 Here for f/u. Undergoing adjuvant pempro with Dr. Gage.  Labs reviewed today Cr 1.7 Alk phos 102 Ca 8.8 Normal LFTs normal CBC  8/17/22 Here for f/u. Underwent CT C/A/P 6/30/22. There was a questionable duodenal nodule. He underwent endoscopy with biopsy which was negative for metastatic disease. He is feeling well, continues on adjuvant pembro with Dr. Ulrich.   2/15/23 Here for f/u. Completed pembro 11/2022. Had CT 11/2022 which was negative for recurrence, duodenal nodule was not visualized. He is feeling well, no complaints. Last creatinine 1.6  8/16/23 Here for f/u. Underwent CT C/A/P last week, no evidence of recurrence. Feeling well, no complaints.  2/26/24: Doing well. CT C/A/P from 12/2023 with no evidence of recurrence. No current complaints.

## 2024-05-07 RX ORDER — LEVOTHYROXINE SODIUM 0.09 MG/1
88 TABLET ORAL DAILY
Qty: 90 | Refills: 0 | Status: ACTIVE | COMMUNITY
Start: 2023-06-19 | End: 1900-01-01

## 2024-05-08 ENCOUNTER — RESULT REVIEW (OUTPATIENT)
Age: 69
End: 2024-05-08

## 2024-05-08 ENCOUNTER — OUTPATIENT (OUTPATIENT)
Dept: OUTPATIENT SERVICES | Facility: HOSPITAL | Age: 69
LOS: 1 days | End: 2024-05-08
Payer: MEDICARE

## 2024-05-08 ENCOUNTER — APPOINTMENT (OUTPATIENT)
Dept: CT IMAGING | Facility: HOSPITAL | Age: 69
End: 2024-05-08

## 2024-05-08 DIAGNOSIS — Z98.49 CATARACT EXTRACTION STATUS, UNSPECIFIED EYE: Chronic | ICD-10-CM

## 2024-05-08 DIAGNOSIS — Z90.49 ACQUIRED ABSENCE OF OTHER SPECIFIED PARTS OF DIGESTIVE TRACT: Chronic | ICD-10-CM

## 2024-05-08 DIAGNOSIS — Z98.890 OTHER SPECIFIED POSTPROCEDURAL STATES: Chronic | ICD-10-CM

## 2024-05-08 LAB — POCT ISTAT CREATININE: 1.6 MG/DL — HIGH (ref 0.5–1.3)

## 2024-05-08 PROCEDURE — 71260 CT THORAX DX C+: CPT | Mod: 26

## 2024-05-08 PROCEDURE — 82565 ASSAY OF CREATININE: CPT

## 2024-05-08 PROCEDURE — 71260 CT THORAX DX C+: CPT

## 2024-05-08 PROCEDURE — 74177 CT ABD & PELVIS W/CONTRAST: CPT | Mod: 26

## 2024-05-08 PROCEDURE — 74177 CT ABD & PELVIS W/CONTRAST: CPT

## 2024-05-14 ENCOUNTER — APPOINTMENT (OUTPATIENT)
Dept: HEMATOLOGY ONCOLOGY | Facility: CLINIC | Age: 69
End: 2024-05-14
Payer: MEDICARE

## 2024-05-14 VITALS
HEIGHT: 68 IN | WEIGHT: 152 LBS | BODY MASS INDEX: 23.04 KG/M2 | OXYGEN SATURATION: 95 % | RESPIRATION RATE: 16 BRPM | HEART RATE: 93 BPM | SYSTOLIC BLOOD PRESSURE: 126 MMHG | TEMPERATURE: 97.9 F | DIASTOLIC BLOOD PRESSURE: 77 MMHG

## 2024-05-14 DIAGNOSIS — I82.220 NEOPLASM OF UNSPECIFIED BEHAVIOR OF RIGHT KIDNEY: ICD-10-CM

## 2024-05-14 DIAGNOSIS — D49.511 NEOPLASM OF UNSPECIFIED BEHAVIOR OF RIGHT KIDNEY: ICD-10-CM

## 2024-05-14 DIAGNOSIS — Z92.89 PERSONAL HISTORY OF OTHER MEDICAL TREATMENT: ICD-10-CM

## 2024-05-14 PROCEDURE — 99214 OFFICE O/P EST MOD 30 MIN: CPT

## 2024-05-14 NOTE — PHYSICAL EXAM
[Fully active, able to carry on all pre-disease performance without restriction] : Status 0 - Fully active, able to carry on all pre-disease performance without restriction [Normal] : affect appropriate [de-identified] : supple [de-identified] : normal RR,  breathing pattern [de-identified] : normal HR [de-identified] : no edema [de-identified] : not distended

## 2024-05-14 NOTE — ASSESSMENT
[FreeTextEntry1] : Mr. 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 Garrett. Started C1 Pembro 200mg(q3 wks) on 12/7/21, C2 Pembro 400mg(q6 wks) on 12/28/21, C3 pembro 200mg on 2/8/22. C4 Pembro was held d/t decreased TSH level. He visited [place] in March; Resumed C4 pembrolizumab 200mg on 4/12/22. Developed hypothyroidism now on levothyroxine 88mcg with Dr. Lockhart, s/p C8 pembrolizumab on 7/6/22. Received C9 pembrolizumab 400mg (6 week dose) on 7/26/22.. s/p C12 pembrolizumab (final) on 11/30/22. Today here for f/u after CT scan.  Plan # Stage III [id number], PD-L1 2-5% / Positive G3 Renal cell carcinoma with IVC thrombosis, s/p Rt radical nephrectomy --completed treatment with adjuvant Pembrolizumab x 1 year on 11/30/22 --surveillance CT C/A/P on 5/2024 with no evidence for recurrence/metastasis. Will repeat scans in 4-5 months for active surveillance, Late Sept 2024, per NCCN guidelines.  He will pass the 3 year anniversary from surgery in 11/2024 and we can space his scans out a bit more after that. --CBC/CMP stable/normal 2/2024.  He reports labs w/ PCP last week were good (not available for review)  # headaches --resolved.  #hypothyroidism/Hashimoto's 2/2 IO therapy -ct levothyroxine 88mcg and f/u with Dr. Lockhart  # duodenal nodule/occasional dyspepsia --s/p EGD + EUS with Dr. Bell on 8/4/22. Per Dr Bell the duodenal papilla was "prominent" but no other findings to account for the duodenal nodule seen on the CT scan. Inspection of the stomach did show findings of gastritis, confirmed on biopsy, H pylori and CMV/HSV negative  # CKD --chronic/stable/mild creatinine elevation since surgery.  BP excellent today.  # preventive care --colonoscopy 2022, cleared x 10 years --maintain f/u with PCP for routine primary care.  RTC 10/8/24 at 1000 AM at [address] after [address] C/A/P   for visit = Marilia with [employer] ID [id number]

## 2024-05-14 NOTE — HISTORY OF PRESENT ILLNESS
[de-identified] : Mr. 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 Garrett. Started C1 Pembro 200mg (q3 wks) on 12/7/21, C2 Pembro 400mg (q6 wks) on 12/28/21, C3 pembro 200mg on 2/8/22.  C4 Pembro was held d/t  decreased TSH level. He visited to [facility] in March and came back to [place] now. Resumed C4 pembrolizumab 200mg on 4/12/22. Developed hypothyroidism  now on levothyroxine 88mcg with Dr. Lockhart. s/p C8 pembrolizumab on 7/6/22. Received C9 pembrolizumab 400mg (6 week dose) on 7/26/22. s/p C12  pembrolizumab. on 11/30/22. Today here for f/u after CT scan.   ONC Hx: Mr. Mills initially was seen by Dr. Brady on 10/18/21 for evaluation of renal mass. His PCP ordered US abdomen for health maintenance and he was found to have 10cm right renal mass.  He does not have any symptoms.  He had CT Kidney performed on 10/12/21 which showed 10.1 x 7.4 x 7.4cm heterogeneous enhancing partially exophytic mass mid to lower pole of right kidney with extensive neovascularity and extension to the renal pelvis at the lower pole,  infrahepatic IVC thrombosis.  He was then referred to Dr. Garrett on 10/20/21 who ordered CT chest and MRI abdomen.  CT Chest on 10/28/21 showed  no suspicious pulmonary finding, MRI abdomen on 10/28/21 with renal mass in right lower pole involving renal pelvis suspicious for renal cell carcinoma, 8.2 cm,  tumor thrombus within right renal vein with extension into infra hepatic IVC. He underwent right radical nephrectomy with  caval thrombus on 11/2/21, pathology showed [id number] Clear cell renal cell carcinoma.   10/12/21 CT kidney w/ and w/o at [facility] -10.1 x 7.4 x 7.4cm heterogeneous enhancing partially exophytic mass mid to lower pole of right kidney with extensive neovascularity and extension to the renal pelvis at the lower pole. No hydronephrosis. Filling defect within right main renal vein extending into infrahepatic IVC consistent with thrombosis. Few associated collateral vessels noted in right renal fossa region. No LAD.    10/21/21 Urine Cytology NEGATIVE FOR HIGH GRADE UROTHELIAL CARCINOMA Squamous cells, urothelial cells, red blood cells, and degenerated cells.  10/28/21 CT Chest No suspicious pulmonary finding. Persistent thrombus within the right renal vein extending into the IVC.  10/28/21 MRI Abdomen 1. Renal mass in right lower pole involving renal pelvis suspicious for renal cell carcinoma, 8.2 cm. 2.Tumor thrombus within right renal vein with extension into infra hepatic IVC.  11/2/21 Surgical Pathology Report Final Diagnosis Kidney, right; radical nephrectomy: - Clear cell renal cell carcinoma, Grade 3 - See Note and Synoptic  - Carcinoma thrombus involving the renal vein. - Carcinoma infiltrates perinephric renal fat. - Margins negative for carcinoma.  Synoptic Summary KIDNEY: Nephrectomy SPECIMEN Procedure:  Radical nephrectomy Specimen Laterality:  Right TUMOR Histologic Type:  Clear cell renal cell carcinoma Histologic Grade:  G3: Nucleoli conspicuous and eosinophilic at 100x magnification Tumor Size: Greatest Dimension (Centimeters) -   7.5 cm Tumor Focality:  Unifocal Tumor Extension:  Tumor extension into perinephric tissue (beyond renal capsule), Tumor extension into major vein (renal vein or its segmental branches, inferior vena cava) Sarcomatoid Features:  Not identified Rhabdoid Features:  Not identified Tumor Necrosis:  Present Lymphovascular Invasion:  Present MARGINS Margins: Uninvolved by invasive carcinoma on this specimen (Clinically, tumor thrombus involving inferior vena cava removed at time of surgery) LYMPH NODES Regional Lymph Nodes:  No lymph nodes submitted or found Primary Tumor (pT):  pT3b Regional Lymph Nodes (pN):  pNX ADDITIONAL FINDINGS Pathologic Findings in Nonneoplastic Kidney:   None identified  PD-L1 IMMUNOHISTOCHEMICAL ANALYSIS: Tumor Proportion Score: 2-5% / Positive  6/30/22 CT CAP 1. Since 10/28/2021, there has been right nephrectomy. 2. New 0.8 cm enhancing mass in the periampullary portion of the duodenum. Recommend endoscopy to evaluate for possible metastatic or primary neoplasm versus bulging papilla. 3. New consolidation surrounding calcific densities in anterior segment of right upper lobe. The calcific densities could represent broncholiths versus aspirated material if patient had been prone at the time. There could be surrounding infection. Recommend bronchoscopy.  11/21/22 CT CAP 1.  Decreased consolidative change surrounding cluster of calcified pulmonary nodules right middle lobe. This is likely post inflammatory or infectious etiology. 2.  Stable post right nephrectomy surgical bed. No complication or local tumor recurrence. 3.  A previously noted periampullary duodenal enhancing mass is not visualized on this examination. However, this may be due to the phase of contrast.  3/29/23 CT CAP No evidence of local or distant disease recurrence.  8/9/23 CT CAP Status post right nephrectomy. No local recurrence or distant metastasis.  12/27/23: CT CAP No evidence of local or distant disease recurrence. Stable post right nephrectomy surgical bed. No evidence of metastasis. [de-identified] : He presents to clinic for f/u after CT scans.  Only new problem L shoulder pain.  Doing PT.  otherwise doing well.  weight stable.  Last visit c/o headaches but these have resolved.  He reports he had labs with PCP yesterday and that his labs were good.

## 2024-08-26 ENCOUNTER — APPOINTMENT (OUTPATIENT)
Dept: UROLOGY | Facility: CLINIC | Age: 69
End: 2024-08-26

## 2024-09-30 ENCOUNTER — APPOINTMENT (OUTPATIENT)
Dept: CT IMAGING | Facility: HOSPITAL | Age: 69
End: 2024-09-30

## 2024-10-08 ENCOUNTER — APPOINTMENT (OUTPATIENT)
Dept: HEMATOLOGY ONCOLOGY | Facility: CLINIC | Age: 69
End: 2024-10-08

## 2025-08-21 ENCOUNTER — NON-APPOINTMENT (OUTPATIENT)
Age: 70
End: 2025-08-21